# Patient Record
Sex: FEMALE | Race: AMERICAN INDIAN OR ALASKA NATIVE
[De-identification: names, ages, dates, MRNs, and addresses within clinical notes are randomized per-mention and may not be internally consistent; named-entity substitution may affect disease eponyms.]

---

## 2017-09-26 ENCOUNTER — HOSPITAL ENCOUNTER (EMERGENCY)
Dept: HOSPITAL 43 - DL.ED | Age: 67
Discharge: HOME | End: 2017-09-26
Payer: COMMERCIAL

## 2017-09-26 VITALS — SYSTOLIC BLOOD PRESSURE: 154 MMHG | DIASTOLIC BLOOD PRESSURE: 98 MMHG

## 2017-09-26 DIAGNOSIS — Z88.8: ICD-10-CM

## 2017-09-26 DIAGNOSIS — S39.011A: Primary | ICD-10-CM

## 2017-09-26 DIAGNOSIS — K21.9: ICD-10-CM

## 2017-09-26 DIAGNOSIS — Z79.82: ICD-10-CM
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
## 2017-09-26 DIAGNOSIS — Z91.018: ICD-10-CM

## 2017-09-26 DIAGNOSIS — E78.00: ICD-10-CM

## 2017-09-26 DIAGNOSIS — Z79.899: ICD-10-CM

## 2017-09-26 DIAGNOSIS — N18.9: ICD-10-CM

## 2017-09-26 DIAGNOSIS — E11.22: ICD-10-CM

## 2017-09-26 DIAGNOSIS — I12.9: ICD-10-CM

## 2017-09-26 DIAGNOSIS — Z91.030: ICD-10-CM

## 2017-09-26 LAB
CHLORIDE SERPL-SCNC: 98 MMOL/L (ref 101–111)
SODIUM SERPL-SCNC: 134 MMOL/L (ref 135–145)

## 2017-09-26 PROCEDURE — 36415 COLL VENOUS BLD VENIPUNCTURE: CPT

## 2017-09-26 PROCEDURE — 85025 COMPLETE CBC W/AUTO DIFF WBC: CPT

## 2017-09-26 PROCEDURE — 83690 ASSAY OF LIPASE: CPT

## 2017-09-26 PROCEDURE — 80053 COMPREHEN METABOLIC PANEL: CPT

## 2017-09-26 PROCEDURE — 80305 DRUG TEST PRSMV DIR OPT OBS: CPT

## 2017-09-26 PROCEDURE — 96372 THER/PROPH/DIAG INJ SC/IM: CPT

## 2017-09-26 PROCEDURE — 81001 URINALYSIS AUTO W/SCOPE: CPT

## 2017-09-26 PROCEDURE — 99284 EMERGENCY DEPT VISIT MOD MDM: CPT

## 2017-09-26 PROCEDURE — 82150 ASSAY OF AMYLASE: CPT

## 2017-09-26 NOTE — EDM.PDOC
ED HPI GENERAL MEDICAL PROBLEM





- General


Chief Complaint: Abdominal Pain


Stated Complaint: RT SIDED SEVERE PAINS, [id number]


Time Seen by Provider: 09/26/17 16:45


Source of Information: Reports: Patient


History Limitations: Reports: No Limitations





- History of Present Illness


INITIAL COMMENTS - FREE TEXT/NARRATIVE: 





This 66 yo female patient reports to the ED with right lower abdominal pain. 

The patient reports her pain started about 3 days ago and has been intermittent 

since that time. The patient reports increase pain when she walks. The patient 

does report that she was moving furniture on Saturday, just before the pain 

started. The patient reports she has been taking Tylenol for temporary symptom 

relief. The patient has not been seen by her primary care facility. 


Onset Date: 09/23/17


Duration: Day(s):, Intermittent


Location: Reports: Abdomen (right lower abdomen/right hip)


Quality: Reports: Ache, Sharp


Severity: Moderate


Improves with: Reports: Rest


Context: Reports: Activity (moving furniture)


Treatments PTA: Reports: Acetaminophen


  ** Right Lower Abdomen


Pain Score (Numeric/FACES): 8





- Related Data


 Allergies











Allergy/AdvReac Type Severity Reaction Status Date / Time


 


chocolate flavor Allergy  Hives Verified 09/26/17 16:07


 


diazepam [From Valium] Allergy  Confusion Verified 09/26/17 16:07


 


diphenhydramine Allergy  Confusion Verified 09/26/17 16:07





[From Benadryl]     


 


kiwi Allergy  Blisters Verified 09/26/17 16:07


 


lisinopril [From Prinivil] Allergy  Swollen Verified 09/26/17 16:07





   Tongue  


 


metformin [From Glucophage] Allergy  Cough Verified 09/26/17 16:07


 


pantoprazole Allergy  Other Verified 09/26/17 16:07


 


pineapple Allergy  Blisters Verified 09/26/17 16:07


 


BEE VENOM Allergy  Swelling Uncoded 09/26/17 16:07











Home Meds: 


 Home Meds





Acetaminophen [Tylenol Extra Strength] 1,000 mg PO Q6H PRN 09/23/16 [History]


Aspirin [Halfprin] 81 mg PO DAILY 09/23/16 [History]


Calcium Citrate/Vitamin D3 [Calcium Citrate + D] 1 tab PO BIDMEALS 09/23/16 [

History]


Cholecalciferol (Vitamin D3) [Vitamin D3] 1 cap PO DAILY 09/23/16 [History]


Famotidine 20 mg PO BID 09/23/16 [History]


Insulin Aspart [Novolog Flexpen] 10 units SQ TID 09/23/16 [History]


Insulin Detemir [Levemir Flextouch] 45 unit SQ BEDTIME 09/23/16 [History]


Isosorbide Mononitrate [Imdur] 30 mg PO DAILY 09/23/16 [History]


Loratadine [Claritin] 10 mg PO DAILY 09/23/16 [History]


Losartan [Cozaar] 100 mg PO DAILY 09/23/16 [History]


Multivitamin [Multivitamins] 1 cap PO .EVERYOTHERDAY 09/23/16 [History]


atorvaSTATin [Lipitor] 10 mg PO DAILY 09/23/16 [History]











Past Medical History


HEENT History: Reports: Cataract, Impaired Vision


Other HEENT History: wears glasses


Cardiovascular History: Reports: High Cholesterol, Hypertension


Respiratory History: Reports: None


Gastrointestinal History: Reports: GERD, Hiatal Hernia


Other Gastrointestinal History: SCHATZKI'S RING


Genitourinary History: Reports: Chronic Renal Insuffiency


Other Genitourinary History: RENAL STENT


OB/GYN History: Reports: Pregnancy


Musculoskeletal History: Reports: Other (See Below)


Other Musculoskeletal History: FRACTURED LEFT ANKLE X3


Neurological History: Reports: None


Psychiatric History: Reports: None


Endocrine/Metabolic History: Reports: Diabetes, Type II


Hematologic History: Reports: None


Immunologic History: Reports: None


Oncologic (Cancer) History: Reports: None


Dermatologic History: Reports: None





- Infectious Disease History


Infectious Disease History: Reports: Chicken Pox, Measles, Mumps





- Past Surgical History


Head Surgeries/Procedures: Reports: None


HEENT Surgical History: Reports: Cataract Surgery


Cardiovascular Surgical History: Reports: Coronary Artery Stent


GI Surgical History: Reports: EGD, Esophageal Dilatation


Musculoskeletal Surgical History: Reports: Arthroscopic Knee





Social & Family History





- Family History


Family Medical History: Noncontributory


Cardiac: Reports: High Cholesterol, Hypertension, MI


: Reports: Dialysis


Musculoskeletal: Reports: Arthritis


Endocrine/Metabolic: Reports: Diabetes, type II


Oncologic: Reports: Lung





- Tobacco Use


Smoking Status *Q: Never Smoker


Second Hand Smoke Exposure: No





- Caffeine Use


Caffeine Use: Reports: Coffee





- Recreational Drug Use


Recreational Drug Use: No





ED ROS GENERAL





- Review of Systems


Review Of Systems: ROS reveals no pertinent complaints other than HPI.





ED EXAM, GI/ABD





- Physical Exam


Exam: See Below


Exam Limited By: No Limitations


General Appearance: Alert, WD/WN, Mild Distress, Obese


Eyes: Bilateral: Normal Appearance, EOMI


Ears: Normal External Exam, Normal Canal, Hearing Grossly Normal, Normal TMs


Nose: Normal Inspection, Normal Mucosa, No Blood


Throat/Mouth: Normal Inspection, Normal Lips, Normal Teeth, Normal Gums, Normal 

Oropharynx, Normal Voice, No Airway Compromise


Head: Atraumatic, Normocephalic


Neck: Normal Inspection, Supple, Non-Tender, Full Range of Motion


Respiratory/Chest: No Respiratory Distress, Lungs Clear, Normal Breath Sounds, 

No Accessory Muscle Use, Chest Non-Tender


Cardiovascular: Normal Peripheral Pulses, Regular Rate, Rhythm, No Edema, No 

Gallop, No JVD, No Murmur, No Rub


GI/Abdominal Exam: Normal Bowel Sounds, Soft, Tender (right lower abdomen (very 

low into the right groin)), Other (no evidence of hernia)


 (Female) Exam: Deferred


Rectal (Female) Exam: Deferred


Back Exam: Normal Inspection, Full Range of Motion, NT


Extremities: Normal Inspection, Normal Range of Motion, Non-Tender, Normal 

Capillary Refill, No Pedal Edema


Neurological: Alert, Oriented, CN II-XII Intact, Normal Cognition, Normal Gait, 

Normal Reflexes, No Motor/Sensory Deficits


Psychiatric: Normal Affect, Normal Mood


Skin Exam: Warm, Dry, Intact, Normal Color, No Rash


Lymphatic: No Adenopathy





Course





- Vital Signs


Last Recorded V/S: 


 Last Vital Signs











Temp  36.2 C   09/26/17 16:22


 


Pulse  67   09/26/17 16:22


 


Resp  18   09/26/17 16:22


 


BP  154/98 H  09/26/17 16:22


 


Pulse Ox  100   09/26/17 16:22














- Orders/Labs/Meds


Orders: 


 Active Orders 24 hr











 Category Date Time Status


 


 UA W/MICROSCOPIC [URIN] Stat Lab  09/26/17 17:04 Received


 


 Orphenadrine [Norflex] Med  09/26/17 17:30 Ordered





 60 mg IM Q12H   








 Medication Orders





Orphenadrine Citrate (Norflex)  60 mg IM Q12H ELVER








Labs: 


 Laboratory Tests











  09/26/17 09/26/17 09/26/17 Range/Units





  16:39 16:39 16:39 


 


WBC   6.3   (5.0-10.0)  10^3/uL


 


RBC   4.26   (4.2-5.4)  10^6/uL


 


Hgb   12.2   (12.0-16.0)  g/dL


 


Hct   36.5 L   (37.0-47.0)  %


 


MCV   85.7   ()  fL


 


MCH   28.6   (27.0-34.0)  pg


 


MCHC   33.4   (33.0-35.0)  g/dL


 


Plt Count   203   (150-450)  10^3/uL


 


Neut % (Auto)   68.7   (42.2-75.2)  %


 


Lymph % (Auto)   20.4 L   (20.5-50.1)  %


 


Mono % (Auto)   6.9   (2-8)  %


 


Eos % (Auto)   3.4 H   (1.0-3.0)  %


 


Baso % (Auto)   0.6   (0.0-1.0)  %


 


Sodium    134 L  (135-145)  mmol/L


 


Potassium    3.9  (3.6-5.0)  mmol/L


 


Chloride    98 L  (101-111)  mmol/L


 


Carbon Dioxide    24.0  (21.0-31.0)  mmol/L


 


Anion Gap    15.9  


 


BUN    21 H  (7-18)  mg/dL


 


Creatinine    1.2  (0.6-1.3)  mg/dL


 


Est Cr Clr Drug Dosing    34.80  mL/min


 


Estimated GFR (MDRD)    45  


 


BUN/Creatinine Ratio    17.50  


 


Glucose    186 H  ()  mg/dL


 


Calcium    9.3  D  (8.4-10.2)  mg/dl


 


Total Bilirubin    0.7  (0.2-1.0)  mg/dL


 


AST    32  (10-42)  IU/L


 


ALT    31  (10-60)  IU/L


 


Alkaline Phosphatase    100  ()  IU/L


 


Total Protein    7.8  (6.7-8.2)  g/dl


 


Albumin    3.7  (3.2-5.5)  g/dl


 


Globulin    4.1  


 


Albumin/Globulin Ratio    0.90  


 


Amylase  29    ()  U/L


 


Lipase  27    (22-51)  U/L


 


Urine Opiates Screen     (NEGATIVE)  


 


Ur Oxycodone Screen     (NEGATIVE)  


 


Urine Methadone Screen     (NEGATIVE)  


 


Ur Barbiturates Screen     (NEGATIVE)  


 


U Tricyclic Antidepress     (NEGATIVE)  


 


Ur Phencyclidine Scrn     (NEGATIVE)  


 


Ur Amphetamine Screen     (NEGATIVE)  


 


U Methamphetamines Scrn     (NEGATIVE)  


 


Urine MDMA Screen     (NEGATIVE)  


 


U Benzodiazepines Scrn     (NEGATIVE)  


 


Urine Cocaine Screen     (NEGATIVE)  


 


U Marijuana (THC) Screen     (NEGATIVE)  














  09/26/17 Range/Units





  17:04 


 


WBC   (5.0-10.0)  10^3/uL


 


RBC   (4.2-5.4)  10^6/uL


 


Hgb   (12.0-16.0)  g/dL


 


Hct   (37.0-47.0)  %


 


MCV   ()  fL


 


MCH   (27.0-34.0)  pg


 


MCHC   (33.0-35.0)  g/dL


 


Plt Count   (150-450)  10^3/uL


 


Neut % (Auto)   (42.2-75.2)  %


 


Lymph % (Auto)   (20.5-50.1)  %


 


Mono % (Auto)   (2-8)  %


 


Eos % (Auto)   (1.0-3.0)  %


 


Baso % (Auto)   (0.0-1.0)  %


 


Sodium   (135-145)  mmol/L


 


Potassium   (3.6-5.0)  mmol/L


 


Chloride   (101-111)  mmol/L


 


Carbon Dioxide   (21.0-31.0)  mmol/L


 


Anion Gap   


 


BUN   (7-18)  mg/dL


 


Creatinine   (0.6-1.3)  mg/dL


 


Est Cr Clr Drug Dosing   mL/min


 


Estimated GFR (MDRD)   


 


BUN/Creatinine Ratio   


 


Glucose   ()  mg/dL


 


Calcium   (8.4-10.2)  mg/dl


 


Total Bilirubin   (0.2-1.0)  mg/dL


 


AST   (10-42)  IU/L


 


ALT   (10-60)  IU/L


 


Alkaline Phosphatase   ()  IU/L


 


Total Protein   (6.7-8.2)  g/dl


 


Albumin   (3.2-5.5)  g/dl


 


Globulin   


 


Albumin/Globulin Ratio   


 


Amylase   ()  U/L


 


Lipase   (22-51)  U/L


 


Urine Opiates Screen  Negative  (NEGATIVE)  


 


Ur Oxycodone Screen  Negative  (NEGATIVE)  


 


Urine Methadone Screen  Negative  (NEGATIVE)  


 


Ur Barbiturates Screen  Negative  (NEGATIVE)  


 


U Tricyclic Antidepress  Negative  (NEGATIVE)  


 


Ur Phencyclidine Scrn  Negative  (NEGATIVE)  


 


Ur Amphetamine Screen  Negative  (NEGATIVE)  


 


U Methamphetamines Scrn  Negative  (NEGATIVE)  


 


Urine MDMA Screen  Negative  (NEGATIVE)  


 


U Benzodiazepines Scrn  Negative  (NEGATIVE)  


 


Urine Cocaine Screen  Negative  (NEGATIVE)  


 


U Marijuana (THC) Screen  Negative  (NEGATIVE)  











Meds: 


Medications











Generic Name Dose Route Start Last Admin





  Trade Name Freq  PRN Reason Stop Dose Admin


 


Orphenadrine Citrate  60 mg  09/26/17 17:30  





  Norflex  IM   





  Q12H ELVER   














Discontinued Medications














Generic Name Dose Route Start Last Admin





  Trade Name Serena  PRN Reason Stop Dose Admin


 


Ketorolac Tromethamine  30 mg  09/26/17 17:26  





  Toradol  IM  09/26/17 17:27  





  ONETIME ONE   














Departure





- Departure


Time of Disposition: 17:29


Disposition: Home, Self-Care 01


Condition: Fair


Clinical Impression: 


Abdominal wall strain


Qualifiers:


 Encounter type: initial encounter Qualified Code(s): S39.011A - Strain of 

muscle, fascia and tendon of abdomen, initial encounter








- Discharge Information


Instructions:  Muscle Strain, Easy-to-Read


Forms:  ED Department Discharge


Care Plan Goals: 


The patient was advised of the examination and lab results during the visit. 

The patient was given an injection of Toradol (60 mg) and Norflex (60 mg) while 

in the ED. The patient was discharged with a script for Toradol (10 mg) #20 to 

take 1 by mouth every 6 hours and Flexeril (10 mg) #10 to take 1 by mouth at 

bedtime as needed. The patient may continue to take Tylenol as directed for 

temporary symptom relief. If the patient has any additional symptoms or further 

concerns, the patient should either follow-up with her primary care facility or 

return to the emergency department. 





- My Orders


Last 24 Hours: 


My Active Orders





09/26/17 17:04


UA W/MICROSCOPIC [URIN] Stat 





09/26/17 17:30


Orphenadrine [Norflex]   60 mg IM Q12H 














- Assessment/Plan


Last 24 Hours: 


My Active Orders





09/26/17 17:04


UA W/MICROSCOPIC [URIN] Stat 





09/26/17 17:30


Orphenadrine [Norflex]   60 mg IM Q12H

## 2019-06-19 ENCOUNTER — HOSPITAL ENCOUNTER (EMERGENCY)
Dept: HOSPITAL 43 - DL.ED | Age: 69
Discharge: NURSING, REHAB OR LONG-TERM CARE | End: 2019-06-19
Payer: COMMERCIAL

## 2019-06-19 VITALS — DIASTOLIC BLOOD PRESSURE: 71 MMHG | SYSTOLIC BLOOD PRESSURE: 109 MMHG

## 2019-06-19 DIAGNOSIS — I12.9: ICD-10-CM

## 2019-06-19 DIAGNOSIS — I21.4: Primary | ICD-10-CM

## 2019-06-19 DIAGNOSIS — Z88.5: ICD-10-CM

## 2019-06-19 DIAGNOSIS — Z88.8: ICD-10-CM

## 2019-06-19 DIAGNOSIS — Z79.82: ICD-10-CM

## 2019-06-19 DIAGNOSIS — N18.9: ICD-10-CM

## 2019-06-19 DIAGNOSIS — K21.9: ICD-10-CM

## 2019-06-19 DIAGNOSIS — E78.00: ICD-10-CM

## 2019-06-19 DIAGNOSIS — E11.22: ICD-10-CM

## 2019-06-19 DIAGNOSIS — Z79.899: ICD-10-CM

## 2019-06-19 DIAGNOSIS — Z91.030: ICD-10-CM

## 2019-06-19 DIAGNOSIS — Z79.4: ICD-10-CM

## 2019-06-19 LAB
ANION GAP SERPL CALC-SCNC: 13.9 MMOL/L
CHLORIDE SERPL-SCNC: 103 MMOL/L (ref 101–111)
SODIUM SERPL-SCNC: 135 MMOL/L (ref 135–145)

## 2019-06-19 RX ADMIN — NITROGLYCERIN ONE MG: 0.4 TABLET SUBLINGUAL at 02:28

## 2019-06-19 RX ADMIN — NITROGLYCERIN ONE MG: 0.4 TABLET SUBLINGUAL at 01:40

## 2019-06-19 RX ADMIN — NITROGLYCERIN ONE MG: 0.4 TABLET SUBLINGUAL at 01:37

## 2019-06-19 NOTE — EDM.PDOC
ED HPI GENERAL MEDICAL PROBLEM





- General


Chief Complaint: Chest Pain


Stated Complaint: CHEST PAIN AND THROWING UP [id number]


Time Seen by Provider: 19 00:25


Source of Information: Reports: Patient, Family


History Limitations: Reports: No Limitations





- History of Present Illness


INITIAL COMMENTS - FREE TEXT/NARRATIVE: 





ED with c/o eating lettuce sald at 2130, 2200 started vomiting x 3 and 5 loose 

diarrhea stools  left sided chest pain sharp jabbing type started after and 

burning in throat. No fever, No sweating, No blood in emesis or stool. No SOB. 

Hx 3 cardiac stents and 1 renal stent.


  ** Left Anterior Chest


Pain Score (Numeric/FACES): 5





- Related Data


 Allergies











Allergy/AdvReac Type Severity Reaction Status Date / Time


 


chocolate flavor Allergy  Hives Verified 19 00:33


 


diazepam [From Valium] Allergy  Confusion Verified 19 00:33


 


diphenhydramine Allergy  Confusion Verified 19 00:33





[From Benadryl]     


 


kiwi Allergy  Blisters Verified 19 00:33


 


lisinopril [From Prinivil] Allergy  Swollen Verified 19 00:33





   Tongue  


 


metformin [From Glucophage] Allergy  Cough Verified 19 00:33


 


pantoprazole Allergy  Other Verified 19 00:33


 


pineapple Allergy  Blisters Verified 19 00:33


 


BEE VENOM Allergy  Swelling Uncoded 19 00:33











Home Meds: 


 Home Meds





Acetaminophen [Tylenol Extra Strength] 1,000 mg PO Q6H PRN 16 [History]


Aspirin [Halfprin] 81 mg PO DAILY 16 [History]


Calcium Citrate/Vitamin D3 [Calcium Citrate + D] 1 tab PO BIDMEALS 16 [

History]


Cholecalciferol (Vitamin D3) [Vitamin D3] 1 cap PO DAILY 16 [History]


Famotidine 20 mg PO BID 16 [History]


Insulin Aspart [Novolog Flexpen] 10 units SQ TID 16 [History]


Insulin Detemir [Levemir Flextouch] 45 unit SQ BEDTIME 16 [History]


Isosorbide Mononitrate [Imdur] 30 mg PO DAILY 16 [History]


Loratadine [Claritin] 10 mg PO DAILY 16 [History]


Losartan [Cozaar] 100 mg PO DAILY 16 [History]


Multivitamin [Multivitamins] 1 cap PO DAILY 16 [History]


atorvaSTATin [Lipitor] 10 mg PO DAILY 16 [History]


Clopidogrel [Plavix] 1 tab PO DAILY 10/09/17 [History]


Ibuprofen [Advil] 800 mg PO TID 10/09/17 [History]











Past Medical History


HEENT History: Reports: Cataract, Impaired Vision


Other HEENT History: wears glasses


Cardiovascular History: Reports: High Cholesterol, Hypertension


Respiratory History: Reports: None


Gastrointestinal History: Reports: GERD, Hiatal Hernia


Other Gastrointestinal History: SCHATZKI'S RING


Genitourinary History: Reports: Chronic Renal Insuffiency


Other Genitourinary History: RENAL STENT


OB/GYN History: Reports: Pregnancy


Musculoskeletal History: Reports: Other (See Below)


Other Musculoskeletal History: FRACTURED LEFT ANKLE X3


Neurological History: Reports: None


Psychiatric History: Reports: None


Endocrine/Metabolic History: Reports: Diabetes, Type II


Hematologic History: Reports: None


Immunologic History: Reports: None


Oncologic (Cancer) History: Reports: None


Dermatologic History: Reports: None





- Infectious Disease History


Infectious Disease History: Reports: Chicken Pox, Measles, Mumps





- Past Surgical History


Head Surgeries/Procedures: Reports: None


HEENT Surgical History: Reports: Cataract Surgery


Cardiovascular Surgical History: Reports: Coronary Artery Stent


Other Cardiovascular Surgeries/Procedures: ELECTROCARDIOGRAM


Respiratory Surgical History: Reports: None


GI Surgical History: Reports: EGD, Esophageal Dilatation


Neurological Surgical History: Reports: None


Musculoskeletal Surgical History: Reports: Arthroscopic Knee


Oncologic Surgical History: Reports: None


Dermatological Surgical History: Reports: None





Social & Family History





- Family History


Family Medical History: Noncontributory


Cardiac: Reports: High Cholesterol, Hypertension, MI


: Reports: Dialysis


Musculoskeletal: Reports: Arthritis


Endocrine/Metabolic: Reports: Diabetes, type II


Oncologic: Reports: Lung





- Tobacco Use


Smoking Status *Q: Never Smoker


Second Hand Smoke Exposure: No





- Caffeine Use


Caffeine Use: Reports: Coffee





- Recreational Drug Use


Recreational Drug Use: No





ED ROS GENERAL





- Review of Systems


Review Of Systems: ROS reveals no pertinent complaints other than HPI.





ED EXAM, GENERAL





- Physical Exam


Exam: See Below


Exam Limited By: No Limitations


General Appearance: Alert, Mild Distress


Eye Exam: Bilateral Eye: EOMI


Ears: Normal External Exam


Nose: Normal Inspection


Throat/Mouth: Normal Inspection, Normal Lips, Normal Voice, No Airway Compromise


Head: Atraumatic, Normocephalic


Neck: Normal Inspection, Full Range of Motion.  No: Lymphadenopathy (L), 

Lymphadenopathy (R)


Respiratory/Chest: No Respiratory Distress, Lungs Clear, Normal Breath Sounds, 

Chest Non-Tender.  No: Respiratory Distress, Crackles, Rales, Rhonchi, Wheezing


Cardiovascular: Normal Peripheral Pulses, Regular Rate, Rhythm, No Edema


GI/Abdominal: Normal Bowel Sounds, Soft.  No: Tender


Back Exam: Normal Inspection


Extremities: Normal Inspection


Neurological: Alert, Oriented, Normal Cognition, Normal Gait


Psychiatric: Flat Affect


Skin Exam: Warm, Dry, Intact, Normal Color





Course





- Vital Signs


Last Recorded V/S: 


 Last Vital Signs











Temp  96.4 F   19 00:14


 


Pulse  88   19 00:14


 


Resp  14   19 00:14


 


BP  109/71   19 02:28


 


Pulse Ox  100   19 00:14














- Orders/Labs/Meds


Orders: 


 Active Orders 24 hr











 Category Date Time Status


 


 EKG 12 Lead [EKG Documentation Completion] [RC] STAT Care  19 00:19 

Active


 


 EKG 12 Lead [EKG Documentation Completion] [RC] STAT Care  19 01:48 

Active











Labs: 


 Laboratory Tests











  19 Range/Units





  00:18 00:25 00:25 


 


WBC   8.7   (5.0-10.0)  10^3/uL


 


RBC   5.13   (4.2-5.4)  10^6/uL


 


Hgb   15.0  D   (12.0-16.0)  g/dL


 


Hct   43.5   (37.0-47.0)  %


 


MCV   84.8   ()  fL


 


MCH   29.2   (27.0-34.0)  pg


 


MCHC   34.5   (33.0-35.0)  g/dL


 


Plt Count   218   (150-450)  10^3/uL


 


Neut % (Auto)   71.0   (42.2-75.2)  %


 


Lymph % (Auto)   19.9 L   (20.5-50.1)  %


 


Mono % (Auto)   5.7   (2-8)  %


 


Eos % (Auto)   3.1 H   (1.0-3.0)  %


 


Baso % (Auto)   0.3   (0.0-1.0)  %


 


PT     (9.0-12.0)  SEC


 


INR     (0.9-1.2)  


 


APTT     (22.0-34.0)  SEC


 


Sodium    135  (135-145)  mmol/L


 


Potassium    3.9  (3.6-5.0)  mmol/L


 


Chloride    103  (101-111)  mmol/L


 


Carbon Dioxide    22.0  (21.0-31.0)  mmol/L


 


Anion Gap    13.9  


 


BUN    22 H  (7-18)  mg/dL


 


Creatinine    1.3  (0.6-1.3)  mg/dL


 


Est Cr Clr Drug Dosing    31.69  mL/min


 


Estimated GFR (MDRD)    41  


 


BUN/Creatinine Ratio    16.92  


 


Glucose    181 H  ()  mg/dL


 


POC Glucose  177 H    ()  mg/dl


 


Calcium    9.6  (8.4-10.2)  mg/dl


 


Total Bilirubin    0.8  (0.2-1.0)  mg/dL


 


AST    72 H  (10-42)  IU/L


 


ALT    78 H  (10-60)  IU/L


 


Alkaline Phosphatase    155 H  ()  IU/L


 


Troponin I    0.37 H*  (0.00-0.02)  ng/ml


 


B-Natriuretic Peptide    65  (0-100)  pg/ml


 


Total Protein    8.4 H  (6.7-8.2)  g/dl


 


Albumin    4.2  (3.2-5.5)  g/dl


 


Globulin    4.2  


 


Albumin/Globulin Ratio    1.00  














  19 Range/Units





  00:25 


 


WBC   (5.0-10.0)  10^3/uL


 


RBC   (4.2-5.4)  10^6/uL


 


Hgb   (12.0-16.0)  g/dL


 


Hct   (37.0-47.0)  %


 


MCV   ()  fL


 


MCH   (27.0-34.0)  pg


 


MCHC   (33.0-35.0)  g/dL


 


Plt Count   (150-450)  10^3/uL


 


Neut % (Auto)   (42.2-75.2)  %


 


Lymph % (Auto)   (20.5-50.1)  %


 


Mono % (Auto)   (2-8)  %


 


Eos % (Auto)   (1.0-3.0)  %


 


Baso % (Auto)   (0.0-1.0)  %


 


PT  9.1  (9.0-12.0)  SEC


 


INR  0.9  (0.9-1.2)  


 


APTT  24.8  (22.0-34.0)  SEC


 


Sodium   (135-145)  mmol/L


 


Potassium   (3.6-5.0)  mmol/L


 


Chloride   (101-111)  mmol/L


 


Carbon Dioxide   (21.0-31.0)  mmol/L


 


Anion Gap   


 


BUN   (7-18)  mg/dL


 


Creatinine   (0.6-1.3)  mg/dL


 


Est Cr Clr Drug Dosing   mL/min


 


Estimated GFR (MDRD)   


 


BUN/Creatinine Ratio   


 


Glucose   ()  mg/dL


 


POC Glucose   ()  mg/dl


 


Calcium   (8.4-10.2)  mg/dl


 


Total Bilirubin   (0.2-1.0)  mg/dL


 


AST   (10-42)  IU/L


 


ALT   (10-60)  IU/L


 


Alkaline Phosphatase   ()  IU/L


 


Troponin I   (0.00-0.02)  ng/ml


 


B-Natriuretic Peptide   (0-100)  pg/ml


 


Total Protein   (6.7-8.2)  g/dl


 


Albumin   (3.2-5.5)  g/dl


 


Globulin   


 


Albumin/Globulin Ratio   











Meds: 


Medications














Discontinued Medications














Generic Name Dose Route Start Last Admin





  Trade Name Freq  PRN Reason Stop Dose Admin


 


Aspirin  324 mg  19 01:20  19 01:31





  Aspirin  PO  19 01:21  324 mg





  ONETIME ONE   Administration





     





     





     





     


 


Clopidogrel Bisulfate  600 mg  19 02:21  19 02:25





  Plavix  PO  19 02:22  600 mg





  ONETIME ONE   Administration





     





     





     





     


 


Heparin Sodium (Porcine)  4,000 units  19 02:00  19 02:17





  Heparin Sodium  IVPUSH  19 02:01  4,000 units





  .BOLUS ONE   Administration





     





     





     





     


 


Sodium Chloride  1,000 mls @ 50 mls/hr  19 01:45  19 02:34





  Normal Saline  IV   450 mls/hr





  ASDIRECTED ELVER   Infusion





     





     





     





     


 


Heparin Sodium/Sodium Chloride  25,000 units in 500 mls @ 17.724 mls/hr   02:00  19 02:21





  Heparin 25,000 Units In 1/2 Ns 500 Ml  IV   12 units/kg/hr





  TITRATE ELVER   17.724 mls/hr





     Administration





     





  Protocol   





  12 UNITS/KG/HR   


 


Nitroglycerin  0.4 mg  19 01:21  19 02:28





  Nitrostat  SL  19 01:22  0.4 mg





  ONETIME ONE   Administration





     





     





     





     


 


Ondansetron HCl  4 mg  19 01:26  19 01:36





  Zofran  IV  19 01:27  4 mg





  ONETIME ONE   Administration





     





     





     





     














- Radiology Interpretation


Free Text/Narrative:: 





[facility] - [facility]


Final Radiology Report  Call: [phone]


assistance Online chat: [email or web address]


Name: KORTNEY VINSON Age: 68Years F Date: 2019


MRN: [id number] SSN: -- : 1950


Study: XR CHEST 1 VIEW FRONTAL Requesting Physician: MOHINDER MEHTA


Accession: [id number] Images: 1


Addl Studies:


Provided Clinical History:


Contrast: Contrast Medium:


Contrast Amount: Contrast Method:


CONFIDENTIALITY STATEMENT


This report is intended only for use by the referring physician, and only in 

accordance with law. If you received this in error, call [phone].


Page 1 of 1


EXAM:


XR Chest, 1 View


EXAM DATE/TIME:


2019 12:44 AM


CLINICAL HISTORY:


68 years old, female; Chest pain; Type not specified


TECHNIQUE:


Imaging protocol: XR of the chest, 1 view.


COMPARISON:


CR Chest 2V 2017 3:56 PM


FINDINGS:


Lungs: Clear lungs.


Pleural space: No pneumothorax. No sizable pleural effusion.


Heart/Mediastinum: No cardiomegaly.


Bones/joints: Unremarkable.


IMPRESSION:


Clear lungs.


Thank you for allowing us to participate in the care of your patient.


Dictated and Authenticated by: Reji Draper MD


2019 1:46 AM Central Time ([place] & [place])





- Re-Assessments/Exams


Free Text/Narrative Re-Assessment/Exam: 





19 05:25


TC Consult Dr Siddiqi and Dr Doe. Elevation V6 partial V5 without reciprocal 

changes. Accept in Tx Tx via LRAS. Stable condition. Nitro x 3 total for relief 

of intermittent chest pain and burning in throat. No pain at time of transfer. 





Departure





- Departure


Time of Disposition: 03:05


Disposition: DC/[facility]fer to Acute Hospital [facility]


Reason for Transfer *Q: Other


Condition: Undetermined


Clinical Impression: 


 NSTEMI (non-ST elevated myocardial infarction)





Referrals: 


PCP,Unobtain [Primary Care Provider] - 


Forms:  ED Department Discharge





- My Orders


Last 24 Hours: 


My Active Orders





19 00:19


EKG 12 Lead [EKG Documentation Completion] [RC] STAT 





19 01:48


EKG 12 Lead [EKG Documentation Completion] [RC] STAT 














- Assessment/Plan


Last 24 Hours: 


My Active Orders





19 00:19


EKG 12 Lead [EKG Documentation Completion] [RC] STAT 





19 01:48


EKG 12 Lead [EKG Documentation Completion] [RC] STAT

## 2019-10-05 ENCOUNTER — HOSPITAL ENCOUNTER (EMERGENCY)
Dept: HOSPITAL 43 - DL.ED | Age: 69
Discharge: HOME | End: 2019-10-05
Payer: COMMERCIAL

## 2019-10-05 VITALS — SYSTOLIC BLOOD PRESSURE: 151 MMHG | HEART RATE: 70 BPM | DIASTOLIC BLOOD PRESSURE: 65 MMHG

## 2019-10-05 DIAGNOSIS — E11.22: ICD-10-CM

## 2019-10-05 DIAGNOSIS — Z79.4: ICD-10-CM

## 2019-10-05 DIAGNOSIS — Z79.82: ICD-10-CM

## 2019-10-05 DIAGNOSIS — R42: ICD-10-CM

## 2019-10-05 DIAGNOSIS — N18.9: ICD-10-CM

## 2019-10-05 DIAGNOSIS — R07.89: Primary | ICD-10-CM

## 2019-10-05 DIAGNOSIS — Z91.018: ICD-10-CM

## 2019-10-05 DIAGNOSIS — E78.00: ICD-10-CM

## 2019-10-05 DIAGNOSIS — Z79.899: ICD-10-CM

## 2019-10-05 DIAGNOSIS — I25.10: ICD-10-CM

## 2019-10-05 DIAGNOSIS — I25.2: ICD-10-CM

## 2019-10-05 DIAGNOSIS — K21.9: ICD-10-CM

## 2019-10-05 DIAGNOSIS — Z88.8: ICD-10-CM

## 2019-10-05 DIAGNOSIS — I13.10: ICD-10-CM

## 2019-10-05 LAB
ANION GAP SERPL CALC-SCNC: 14.2 MMOL/L
CHLORIDE SERPL-SCNC: 99 MMOL/L (ref 101–111)
SODIUM SERPL-SCNC: 134 MMOL/L (ref 135–145)

## 2019-10-05 PROCEDURE — 99285 EMERGENCY DEPT VISIT HI MDM: CPT

## 2019-10-05 PROCEDURE — 93005 ELECTROCARDIOGRAM TRACING: CPT

## 2019-10-05 PROCEDURE — 80053 COMPREHEN METABOLIC PANEL: CPT

## 2019-10-05 PROCEDURE — 83880 ASSAY OF NATRIURETIC PEPTIDE: CPT

## 2019-10-05 PROCEDURE — 83690 ASSAY OF LIPASE: CPT

## 2019-10-05 PROCEDURE — 85025 COMPLETE CBC W/AUTO DIFF WBC: CPT

## 2019-10-05 PROCEDURE — 84484 ASSAY OF TROPONIN QUANT: CPT

## 2019-10-05 PROCEDURE — 71045 X-RAY EXAM CHEST 1 VIEW: CPT

## 2019-10-05 PROCEDURE — 36415 COLL VENOUS BLD VENIPUNCTURE: CPT

## 2019-10-05 NOTE — EDM.PDOC
Scribed by Анна Ndiaye 10/05/19 2817 for Tanner Jose MD





ED HPI GENERAL MEDICAL PROBLEM





- General


Chief Complaint: Chest Pain


Stated Complaint: CHEST PAINS


Time Seen by Provider: 10/05/19 14:20


Source of Information: Reports: Patient, RN, RN Notes Reviewed


History Limitations: Reports: No Limitations





- History of Present Illness


INITIAL COMMENTS - FREE TEXT/NARRATIVE: 


Patient presents to ER via POV with chest pain that started worsening last 

night. She had triple by pass in . Since her bypass she reports frequent/

recurrent episodes of similar pain and goes across her chest. Pt states she 

told her "heart doctor" about the pains and was told it was nothing to worry 

about. She was slightly lightheaded this morning as well. Currently the pt 

states she is pain free. She took 1 baby ASA prior to arrival. She does not 

have nitro at home. She states it feels like tightness and burning. Denies cough

, fever, chills, wheezing, orthopnea, edema, or nausea. Pt states she just got 

custody of her 4yr old granddaughter and is home alone with the child so she 

wanted to make sure that her heart was ok today. Pt's medical record shows that 

her Troponin has been steady at 0.04 since her CABG in 2019.





Onset Date: 10/04/19


Duration: Chronic, Recurring


Location: Reports: Chest


Quality: Reports: Burning, Pressure


Severity: Severe


Improves with: Reports: None


Worsens with: Reports: None


Associated Symptoms: Reports: No Other Symptoms


  ** Chest


Pain Score (Numeric/FACES): 6





- Related Data


 Allergies











Allergy/AdvReac Type Severity Reaction Status Date / Time


 


chocolate flavor Allergy  Hives Verified 10/05/19 14:32


 


diazepam [From Valium] Allergy  Confusion Verified 10/05/19 14:32


 


diphenhydramine Allergy  Confusion Verified 10/05/19 14:32





[From Benadryl]     


 


kiwi Allergy  Blisters Verified 10/05/19 14:32


 


lisinopril [From Prinivil] Allergy  Swollen Verified 10/05/19 14:32





   Tongue  


 


metformin [From Glucophage] Allergy  Cough Verified 10/05/19 14:32


 


pantoprazole Allergy  Other Verified 10/05/19 14:32


 


pineapple Allergy  Blisters Verified 10/05/19 14:32


 


BEE VENOM Allergy  Swelling Uncoded 10/05/19 14:32











Home Meds: 


 Home Meds





Acetaminophen [Tylenol Extra Strength] 1,000 mg PO Q6H PRN 16 [History]


Aspirin [Halfprin] 81 mg PO DAILY 16 [History]


Calcium Citrate/Vitamin D3 [Calcium Citrate + D] 1 tab PO DAILY 16 [

History]


Cholecalciferol (Vitamin D3) [Vitamin D3] 1,000 units PO DAILY 16 [History

]


Insulin Aspart [Novolog Flexpen] 14 units SQ ASDIRECTED 16 [History]


Insulin Detemir [Levemir Flextouch] 45 unit SQ BEDTIME 16 [History]


Loratadine [Claritin] 10 mg PO DAILY 16 [History]


Multivitamin [Multivitamins] 1 cap PO DAILY 16 [History]


atorvaSTATin [Lipitor] 40 mg PO BEDTIME 16 [History]


Docusate Sodium 200 mg PO BID PRN 19 [History]


Ferrous Sulfate 325 mg PO BID 19 [History]


Metoprolol Tartrate 12.5 mg PO BID 19 [History]


Omeprazole 20 mg PO DAILY 19 [History]


Saxagliptin HCl [Onglyza] 50 mg PO DAILY 19 [History]











Past Medical History


HEENT History: Reports: Cataract, Impaired Vision


Other HEENT History: wears glasses


Cardiovascular History: Reports: CAD, High Cholesterol, Hypertension, MI


Respiratory History: Reports: None


Gastrointestinal History: Reports: GERD, Hiatal Hernia


Other Gastrointestinal History: SCHATZKI'S RING


Genitourinary History: Reports: Chronic Renal Insuffiency


Other Genitourinary History: RENAL STENT


OB/GYN History: Reports: Pregnancy


Musculoskeletal History: Reports: Other (See Below)


Other Musculoskeletal History: FRACTURED LEFT ANKLE X3


Neurological History: Reports: None


Psychiatric History: Reports: None


Endocrine/Metabolic History: Reports: Diabetes, Type II


Hematologic History: Reports: None


Immunologic History: Reports: None


Oncologic (Cancer) History: Reports: None


Dermatologic History: Reports: None





- Infectious Disease History


Infectious Disease History: Reports: Chicken Pox, Measles, Mumps





- Past Surgical History


Head Surgeries/Procedures: Reports: None


HEENT Surgical History: Reports: Cataract Surgery


Cardiovascular Surgical History: Reports: Coronary Artery Bypass, Coronary 

Artery Stent


Other Cardiovascular Surgeries/Procedures: ELECTROCARDIOGRAM


Respiratory Surgical History: Reports: None


GI Surgical History: Reports: EGD, Esophageal Dilatation


Neurological Surgical History: Reports: None


Musculoskeletal Surgical History: Reports: Arthroscopic Knee


Oncologic Surgical History: Reports: None


Dermatological Surgical History: Reports: None





Social & Family History





- Family History


Family Medical History: Noncontributory


Cardiac: Reports: High Cholesterol, Hypertension, MI


: Reports: Dialysis


Musculoskeletal: Reports: Arthritis


Endocrine/Metabolic: Reports: Diabetes, type II


Oncologic: Reports: Lung





- Caffeine Use


Caffeine Use: Reports: Coffee





- Living Situation & Occupation


Living situation: Reports: with Family


Occupation: Retired





ED ROS GENERAL





- Review of Systems


Review Of Systems: ROS reveals no pertinent complaints other than HPI.





ED EXAM, GENERAL





- Physical Exam


Exam: See Below


Exam Limited By: No Limitations


General Appearance: Alert, WD/WN, No Apparent Distress, Anxious


Eye Exam: Bilateral Eye: Normal Inspection


Nose: Normal Inspection


Throat/Mouth: Normal Inspection, Normal Lips, Normal Voice, No Airway Compromise


Head: Atraumatic, Normocephalic


Neck: Normal Inspection, Supple, Non-Tender, Full Range of Motion


Respiratory/Chest: No Respiratory Distress, Lungs Clear, Normal Breath Sounds, 

No Accessory Muscle Use, Chest Non-Tender


Cardiovascular: Regular Rate, Rhythm, No Edema, No JVD, No Murmur


GI/Abdominal: Normal Bowel Sounds, Soft, Non-Tender, No Organomegaly, No 

Distention, No Abnormal Bruit, No Mass


Back Exam: Normal Inspection


Extremities: Normal Inspection, Normal Range of Motion, Non-Tender, Normal 

Capillary Refill, No Pedal Edema


Neurological: Alert, Oriented, CN II-XII Intact, Normal Cognition, Normal Gait, 

No Motor/Sensory Deficits


Psychiatric: Anxious


Skin Exam: Warm, Dry, Intact, Normal Color, No Rash





EKG INTERPRETATION


EKG Date: 10/05/19


Time: 14:16


Rhythm: Other (sinus rhythm)


Rate (Beats/Min): 71


Axis: Normal


P-Wave: Present


QRS: Other (inferior Q waves)


ST-T: Elevated (nonspecific T wave abnormalities in lateral leads)


QT: Normal


Comparison: No Change





Course





- Vital Signs


Last Recorded V/S: 


 Last Vital Signs











Temp  97.2 F   10/05/19 14:18


 


Pulse  70   10/05/19 14:18


 


Resp  12   10/05/19 14:18


 


BP  151/65 H  10/05/19 14:18


 


Pulse Ox  100   10/05/19 14:18








 





Orthostatic Blood Pressure [     146/82


Standing]                        


Orthostatic Blood Pressure [     154/78


Sitting]                         


Orthostatic Blood Pressure [     140/68


Supine]                          





Not orthostatic.








- Orders/Labs/Meds


Orders: 


 Active Orders 24 hr











 Category Date Time Status


 


 EKG 12 Lead [EKG Documentation Completion] [RC] STAT Care  10/05/19 14:20 

Active


 


 Orthostatic Vital Signs [RC] ASDIRECTED Care  10/05/19 15:25 Active


 


 Peripheral IV Care [RC] .AS DIRECTED Care  10/05/19 14:20 Active


 


 Chest 1V Frontal [CR] Stat Exams  10/05/19 14:20 Taken


 


 Sodium Chloride 0.9% [Saline Flush] Med  10/05/19 14:20 Active





 10 ml FLUSH ASDIRECTED PRN   


 


 Peripheral IV Insertion Adult [OM.PC] Stat Oth  10/05/19 14:20 Ordered








 Medication Orders





Sodium Chloride (Saline Flush)  10 ml FLUSH ASDIRECTED PRN


   PRN Reason: Keep Vein Open


   Last Admin: 10/05/19 14:28  Dose: 10 ml








Labs: 


 Laboratory Tests











  10/05/19 10/05/19 Range/Units





  14:34 14:34 


 


WBC  5.8   (5.0-10.0)  10^3/uL


 


RBC  4.46   (4.2-5.4)  10^6/uL


 


Hgb  12.5   (12.0-16.0)  g/dL


 


Hct  37.7   (37.0-47.0)  %


 


MCV  84.5   ()  fL


 


MCH  28.0   (27.0-34.0)  pg


 


MCHC  33.2   (33.0-35.0)  g/dL


 


Plt Count  259   (150-450)  10^3/uL


 


Neut % (Auto)  76.6 H   (42.2-75.2)  %


 


Lymph % (Auto)  16.1 L   (20.5-50.1)  %


 


Mono % (Auto)  4.2   (2-8)  %


 


Eos % (Auto)  2.8   (1.0-3.0)  %


 


Baso % (Auto)  0.3   (0.0-1.0)  %


 


Sodium   134 L  (135-145)  mmol/L


 


Potassium   4.2  (3.6-5.0)  mmol/L


 


Chloride   99 L  (101-111)  mmol/L


 


Carbon Dioxide   25.0  (21.0-31.0)  mmol/L


 


Anion Gap   14.2  


 


BUN   17  (7-18)  mg/dL


 


Creatinine   1.1  (0.6-1.3)  mg/dL


 


Est Cr Clr Drug Dosing   36.94  mL/min


 


Estimated GFR (MDRD)   49  


 


BUN/Creatinine Ratio   15.45  


 


Glucose   241 H  ()  mg/dL


 


Calcium   9.1  (8.4-10.2)  mg/dl


 


Total Bilirubin   0.8  (0.2-1.0)  mg/dL


 


AST   28  (10-42)  IU/L


 


ALT   19  (10-60)  IU/L


 


Alkaline Phosphatase   109  ()  IU/L


 


Troponin I   0.04 H*  (0.00-0.02)  ng/ml


 


B-Natriuretic Peptide   82  (0-100)  pg/ml


 


Total Protein   7.5  (6.7-8.2)  g/dl


 


Albumin   3.8  (3.2-5.5)  g/dl


 


Globulin   3.7  


 


Albumin/Globulin Ratio   1.03  


 


Lipase   39  (22-51)  U/L











Meds: 


Medications











Generic Name Dose Route Start Last Admin





  Trade Name Freq  PRN Reason Stop Dose Admin


 


Sodium Chloride  10 ml  10/05/19 14:20  10/05/19 14:28





  Saline Flush  FLUSH   10 ml





  ASDIRECTED PRN   Administration





  Keep Vein Open   





     





     





     














Discontinued Medications














Generic Name Dose Route Start Last Admin





  Trade Name Freq  PRN Reason Stop Dose Admin


 


Aspirin  324 mg  10/05/19 14:20  10/05/19 14:27





  Aspirin  PO  10/05/19 14:21  324 mg





  ONETIME ONE   Administration





     





     





     





     














- Radiology Interpretation


Free Text/Narrative:: 


[facility]


Final Radiology Report  Call: [phone]


assistance Online chat: [email or web address]


Name: KORTNEY VINSON Age: 69Years F Date: 10/05/2019


MRN: [id number] SSN: -- : 1950


Study: XR CHEST 1 VIEW FRONTAL Requesting Physician: TANNER JOSE


Accession: [id number] Images: 1


Addl Studies:


Provided Clinical History:


Contrast: Contrast Medium:


Contrast Amount: Contrast Method:


CONFIDENTIALITY STATEMENT


This report is intended only for use by the referring physician, and only in 

accordance with law. If you received this in error, call [phone].


Page 1 of 1


PROCEDURE INFORMATION:


Exam: XR Chest, 1 View


Exam date and time: 10/5/2019 2:48 PM


Clinical history: 69 years old, female; Other: Chest pain


TECHNIQUE:


Imaging protocol: XR of the chest


Views: 1 view.


COMPARISON:


CR Chest 1V Frontal 2019 11:42 AM


FINDINGS:


Lungs: Nonspecific bibasilar consolidation is present, consistent with 

atelectasis, edema, or


pneumonia.


Pleural space: Unremarkable. No pleural effusion. No pneumothorax.


Heart/Mediastinum: The heart demonstrates mild diffuse enlargement.


Bones/joints: There are sternal wires consistent with previous sternotomy 

incision. The thoracic


spine demonstrates mild degenerative changes at multiple levels.


IMPRESSION:


Nonspecific bibasilar consolidation is present, consistent with atelectasis, 

edema, or pneumonia.


Thank you for allowing us to participate in the care of your patient.


Dictated and Authenticated by: Fredrick Zaidi DO


10/05/2019 3:00 PM Central Time ([place] & [place])





Departure





- Departure


Time of Disposition: 15:40


Disposition: Home, Self-Care 01


Condition: Good (dizziness)


Clinical Impression: 


 Dizziness, Atypical chest pain





Instructions:  Dizziness, Nonspecific Chest Pain, Easy-to-Read


Forms:  ED Department Discharge


Additional Instructions: 


Continue your current medications as prescribed.


Follow up in clinic for a Life Alert necklace or wrist band to push in case you 

have an emergency when you are alone.








- My Orders


Last 24 Hours: 


My Active Orders





10/05/19 14:20


EKG 12 Lead [EKG Documentation Completion] [RC] STAT 


Peripheral IV Care [RC] .AS DIRECTED 


Chest 1V Frontal [CR] Stat 


Sodium Chloride 0.9% [Saline Flush]   10 ml FLUSH ASDIRECTED PRN 


Peripheral IV Insertion Adult [OM.PC] Stat 





10/05/19 15:25


Orthostatic Vital Signs [RC] ASDIRECTED 














- Assessment/Plan


Last 24 Hours: 


My Active Orders





10/05/19 14:20


EKG 12 Lead [EKG Documentation Completion] [RC] STAT 


Peripheral IV Care [RC] .AS DIRECTED 


Chest 1V Frontal [CR] Stat 


Sodium Chloride 0.9% [Saline Flush]   10 ml FLUSH ASDIRECTED PRN 


Peripheral IV Insertion Adult [OM.PC] Stat 





10/05/19 15:25


Orthostatic Vital Signs [RC] ASDIRECTED 














I have read and agree with the documentation that has been completed regarding 

this visit. By signing this record, I attest that the documentation was 

completed in my physical presence and is an accurate record of the encounter.

## 2019-12-31 ENCOUNTER — HOSPITAL ENCOUNTER (EMERGENCY)
Dept: HOSPITAL 43 - DL.ED | Age: 69
Discharge: HOME | End: 2019-12-31
Payer: COMMERCIAL

## 2019-12-31 VITALS — HEART RATE: 79 BPM | DIASTOLIC BLOOD PRESSURE: 79 MMHG | SYSTOLIC BLOOD PRESSURE: 130 MMHG

## 2019-12-31 DIAGNOSIS — I25.2: ICD-10-CM

## 2019-12-31 DIAGNOSIS — I12.9: ICD-10-CM

## 2019-12-31 DIAGNOSIS — E11.22: ICD-10-CM

## 2019-12-31 DIAGNOSIS — Z79.899: ICD-10-CM

## 2019-12-31 DIAGNOSIS — N18.9: ICD-10-CM

## 2019-12-31 DIAGNOSIS — Z79.4: ICD-10-CM

## 2019-12-31 DIAGNOSIS — Z79.82: ICD-10-CM

## 2019-12-31 DIAGNOSIS — R07.81: Primary | ICD-10-CM

## 2019-12-31 LAB
ANION GAP SERPL CALC-SCNC: 16 MMOL/L
CHLORIDE SERPL-SCNC: 97 MMOL/L (ref 101–111)
SODIUM SERPL-SCNC: 132 MMOL/L (ref 135–145)

## 2019-12-31 NOTE — EDM.PDOC
<WarrenTito M - Last Filed: 12/31/19 18:53>





ED HPI GENERAL MEDICAL PROBLEM





- General


Chief Complaint: Chest Pain


Stated Complaint: CHEST PAINS


Time Seen by Provider: 12/31/19 18:48


Source of Information: Reports: Patient


History Limitations: Reports: No Limitations





- History of Present Illness


INITIAL COMMENTS - FREE TEXT/NARRATIVE: 





This 70 yo female patient reports to the ED with left sided chest pain that 

started yesterday. The patient reports increased pain with deep breathing. The 

patient denies any falls or trauma to the area. The patient reports she noticed 

the pain after coughing. The patient locates the pain under her left breast, 

but she is not able to push on the area to increase the pain. 


Onset Date: 12/30/19


Duration: Constant


Location: Reports: Chest (left chest)


Quality: Reports: Ache, Sharp, Stabbing


Severity: Moderate


Improves with: Reports: None


Worsens with: Reports: None


Context: Reports: Other


Associated Symptoms: Reports: No Other Symptoms


Treatments PTA: Reports: Acetaminophen


  ** Left Chest


Pain Score (Numeric/FACES): 6





- Related Data


 Allergies











Allergy/AdvReac Type Severity Reaction Status Date / Time


 


chocolate flavor Allergy  Hives Verified 12/31/19 17:58


 


diazepam [From Valium] Allergy  Confusion Verified 12/31/19 17:58


 


diphenhydramine Allergy  Confusion Verified 12/31/19 17:58





[From Benadryl]     


 


kiwi Allergy  Blisters Verified 12/31/19 17:58


 


lisinopril [From Prinivil] Allergy  Swollen Verified 12/31/19 17:58





   Tongue  


 


metformin [From Glucophage] Allergy  Cough Verified 12/31/19 17:58


 


pantoprazole Allergy  Other Verified 12/31/19 17:58


 


pineapple Allergy  Blisters Verified 12/31/19 17:58


 


BEE VENOM Allergy  Swelling Uncoded 12/31/19 17:58











Home Meds: 


 Home Meds





Acetaminophen [Tylenol Extra Strength] 1,000 mg PO Q6H PRN 09/23/16 [History]


Aspirin [Halfprin] 81 mg PO DAILY 09/23/16 [History]


Calcium Citrate/Vitamin D3 [Calcium Citrate + D] 1 tab PO DAILY 09/23/16 [

History]


Cholecalciferol (Vitamin D3) [Vitamin D3] 1,000 units PO DAILY 09/23/16 [History

]


Insulin Aspart [Novolog Flexpen] 14 units SQ ASDIRECTED 09/23/16 [History]


Insulin Detemir [Levemir Flextouch] 45 unit SQ BEDTIME 09/23/16 [History]


Loratadine [Claritin] 10 mg PO DAILY 09/23/16 [History]


Multivitamin [Multivitamins] 1 cap PO DAILY 09/23/16 [History]


atorvaSTATin [Lipitor] 40 mg PO BEDTIME 09/23/16 [History]


Docusate Sodium 200 mg PO BID PRN 08/07/19 [History]


Ferrous Sulfate 325 mg PO BID 08/07/19 [History]


Metoprolol Tartrate 12.5 mg PO BID 08/07/19 [History]


Omeprazole 20 mg PO DAILY 08/07/19 [History]


Saxagliptin HCl [Onglyza] 5 mg PO DAILY 08/07/19 [History]


traMADol [Ultram] 50 mg PO Q8H PRN 12/31/19 [History]











Past Medical History


HEENT History: Reports: Cataract, Impaired Vision


Other HEENT History: wears glasses


Cardiovascular History: Reports: CAD, High Cholesterol, Hypertension, MI


Respiratory History: Reports: None


Gastrointestinal History: Reports: GERD, Hiatal Hernia


Other Gastrointestinal History: SCHATZKI'S RING


Genitourinary History: Reports: Chronic Renal Insuffiency


Other Genitourinary History: RENAL STENT


OB/GYN History: Reports: Pregnancy


Musculoskeletal History: Reports: Other (See Below)


Other Musculoskeletal History: FRACTURED LEFT ANKLE X3


Neurological History: Reports: None


Psychiatric History: Reports: None


Endocrine/Metabolic History: Reports: Diabetes, Type II


Hematologic History: Reports: None


Immunologic History: Reports: None


Oncologic (Cancer) History: Reports: None


Dermatologic History: Reports: None





- Infectious Disease History


Infectious Disease History: Reports: Chicken Pox, Measles, Mumps





- Past Surgical History


Head Surgeries/Procedures: Reports: None


HEENT Surgical History: Reports: Cataract Surgery


Cardiovascular Surgical History: Reports: Coronary Artery Bypass, Coronary 

Artery Stent


Other Cardiovascular Surgeries/Procedures: ELECTROCARDIOGRAM


Respiratory Surgical History: Reports: None


GI Surgical History: Reports: EGD, Esophageal Dilatation


Neurological Surgical History: Reports: None


Musculoskeletal Surgical History: Reports: Arthroscopic Knee


Oncologic Surgical History: Reports: None


Dermatological Surgical History: Reports: None





Social & Family History





- Family History


Family Medical History: Noncontributory


Cardiac: Reports: High Cholesterol, Hypertension, MI


: Reports: Dialysis


Musculoskeletal: Reports: Arthritis


Endocrine/Metabolic: Reports: Diabetes, type II


Oncologic: Reports: Lung





- Tobacco Use


Smoking Status *Q: Never Smoker


Second Hand Smoke Exposure: Yes





- Caffeine Use


Caffeine Use: Reports: Coffee





- Recreational Drug Use


Recreational Drug Use: No





- Living Situation & Occupation


Living situation: Reports: with Family


Occupation: Retired





ED ROS GENERAL





- Review of Systems


Review Of Systems: Comprehensive ROS is negative, except as noted in HPI.





ED EXAM, GENERAL





- Physical Exam


Exam: See Below


Exam Limited By: No Limitations


General Appearance: Alert, WD/WN, Moderate Distress


Eye Exam: Bilateral Eye: EOMI, Normal Inspection, PERRL


Ears: Normal External Exam, Normal Canal, Hearing Grossly Normal, Normal TMs


Nose: Normal Inspection, Normal Mucosa, No Blood


Throat/Mouth: Normal Inspection, Normal Lips, Normal Teeth, Normal Gums, Normal 

Oropharynx, Normal Voice, No Airway Compromise


Head: Atraumatic, Normocephalic


Neck: Normal Inspection, Supple, Non-Tender, Full Range of Motion


Respiratory/Chest: No Respiratory Distress, Lungs Clear, Normal Breath Sounds, 

No Accessory Muscle Use, Other (left chest wall tenderness)


Cardiovascular: Normal Peripheral Pulses, Regular Rate, Rhythm, No Edema, No 

Gallop, No JVD, No Murmur, No Rub


GI/Abdominal: Normal Bowel Sounds, Soft, Non-Tender, No Organomegaly, No 

Distention, No Abnormal Bruit, No Mass


 (Female) Exam: Deferred


Rectal (Female) Exam: Deferred


Extremities: Normal Inspection, Normal Range of Motion, Non-Tender, Normal 

Capillary Refill, No Pedal Edema


Neurological: Alert, Oriented, CN II-XII Intact, Normal Cognition, Normal Gait, 

Normal Reflexes, No Motor/Sensory Deficits


Psychiatric: Normal Affect, Normal Mood


Skin Exam: Warm, Dry, Intact, Normal Color, No Rash


Lymphatic: No Adenopathy





Course





- Vital Signs


Last Recorded V/S: 





 Last Vital Signs











Temp  98.4 F   12/31/19 18:04


 


Pulse  79   12/31/19 18:04


 


Resp  17   12/31/19 18:04


 


BP  130/79   12/31/19 18:04


 


Pulse Ox  97   12/31/19 18:04














- Orders/Labs/Meds


Orders: 





 Active Orders 24 hr











 Category Date Time Status


 


 EKG Documentation Completion [RC] STAT Care  12/31/19 18:20 Active


 


 Chest 1V Frontal [CR] Urgent Exams  12/31/19 18:20 Taken











Labs: 





 Laboratory Tests











  12/31/19 12/31/19 Range/Units





  18:09 18:09 


 


WBC  7.2   (5.0-10.0)  10^3/uL


 


RBC  4.17 L   (4.2-5.4)  10^6/uL


 


Hgb  12.6   (12.0-16.0)  g/dL


 


Hct  36.1 L   (37.0-47.0)  %


 


MCV  86.6   ()  fL


 


MCH  30.2   (27.0-34.0)  pg


 


MCHC  34.9   (33.0-35.0)  g/dL


 


Plt Count  220   (150-450)  10^3/uL


 


Neut % (Auto)  72.8   (42.2-75.2)  %


 


Lymph % (Auto)  15.5 L   (20.5-50.1)  %


 


Mono % (Auto)  9.9 H   (2-8)  %


 


Eos % (Auto)  1.7   (1.0-3.0)  %


 


Baso % (Auto)  0.1   (0.0-1.0)  %


 


Sodium   132 L  (135-145)  mmol/L


 


Potassium   4.0  (3.6-5.0)  mmol/L


 


Chloride   97 L  (101-111)  mmol/L


 


Carbon Dioxide   23.0  (21.0-31.0)  mmol/L


 


Anion Gap   16.0  


 


BUN   14  (7-18)  mg/dL


 


Creatinine   1.0  (0.6-1.3)  mg/dL


 


Est Cr Clr Drug Dosing   40.63  mL/min


 


Estimated GFR (MDRD)   55  


 


BUN/Creatinine Ratio   14.00  


 


Glucose   293 H  ()  mg/dL


 


Calcium   9.1  (8.4-10.2)  mg/dl


 


Total Bilirubin   0.6  (0.2-1.0)  mg/dL


 


AST   20  (10-42)  IU/L


 


ALT   17  (10-60)  IU/L


 


Alkaline Phosphatase   92  ()  IU/L


 


Troponin I   < 0.02  (0.00-0.02)  ng/ml


 


Total Protein   7.5  (6.7-8.2)  g/dl


 


Albumin   3.8  (3.2-5.5)  g/dl


 


Globulin   3.7  


 


Albumin/Globulin Ratio   1.03  














Departure





- Departure


Disposition: Home, Self-Care 01


Clinical Impression: 


 Pleuritic chest pain





Forms:  ED Department Discharge


Additional Instructions: 


light activity


bland diet


tylenol every 4-6 hours as needed for discomfort


warm pack to chest area


follow up as needed








Sepsis Event Note





- Evaluation


Sepsis Screening Result: No Definite Risk





- Focused Exam


Vital Signs: 





 Vital Signs











  Temp Pulse Resp BP Pulse Ox


 


 12/31/19 18:04  98.4 F  79  17  130/79  97











Date Exam was Performed: 12/31/19


Time Exam was Performed: 18:53





<Prachi Ellis - Last Filed: 12/31/19 20:17>





Departure





- Departure


Time of Disposition: 20:14


Condition: Good





Sepsis Event Note





- Focused Exam


Date Exam was Performed: 12/31/19


Time Exam was Performed: 20:14

## 2020-08-21 ENCOUNTER — HOSPITAL ENCOUNTER (INPATIENT)
Dept: HOSPITAL 43 - DL.ED | Age: 70
LOS: 2 days | Discharge: HOME | DRG: 690 | End: 2020-08-23
Attending: INTERNAL MEDICINE | Admitting: INTERNAL MEDICINE
Payer: COMMERCIAL

## 2020-08-21 DIAGNOSIS — I25.2: ICD-10-CM

## 2020-08-21 DIAGNOSIS — E78.00: ICD-10-CM

## 2020-08-21 DIAGNOSIS — E11.22: ICD-10-CM

## 2020-08-21 DIAGNOSIS — Z98.49: ICD-10-CM

## 2020-08-21 DIAGNOSIS — E87.8: ICD-10-CM

## 2020-08-21 DIAGNOSIS — Z79.82: ICD-10-CM

## 2020-08-21 DIAGNOSIS — H54.7: ICD-10-CM

## 2020-08-21 DIAGNOSIS — Z91.013: ICD-10-CM

## 2020-08-21 DIAGNOSIS — Z91.02: ICD-10-CM

## 2020-08-21 DIAGNOSIS — K44.9: ICD-10-CM

## 2020-08-21 DIAGNOSIS — N18.9: ICD-10-CM

## 2020-08-21 DIAGNOSIS — K21.9: ICD-10-CM

## 2020-08-21 DIAGNOSIS — Z96.0: ICD-10-CM

## 2020-08-21 DIAGNOSIS — I25.10: ICD-10-CM

## 2020-08-21 DIAGNOSIS — M19.90: ICD-10-CM

## 2020-08-21 DIAGNOSIS — E87.1: ICD-10-CM

## 2020-08-21 DIAGNOSIS — Z95.1: ICD-10-CM

## 2020-08-21 DIAGNOSIS — Z95.5: ICD-10-CM

## 2020-08-21 DIAGNOSIS — K22.2: ICD-10-CM

## 2020-08-21 DIAGNOSIS — N39.0: Primary | ICD-10-CM

## 2020-08-21 DIAGNOSIS — Z88.8: ICD-10-CM

## 2020-08-21 DIAGNOSIS — E11.9: ICD-10-CM

## 2020-08-21 DIAGNOSIS — Z91.018: ICD-10-CM

## 2020-08-21 DIAGNOSIS — Z91.030: ICD-10-CM

## 2020-08-21 DIAGNOSIS — I12.9: ICD-10-CM

## 2020-08-21 DIAGNOSIS — Z79.4: ICD-10-CM

## 2020-08-21 DIAGNOSIS — I70.1: ICD-10-CM

## 2020-08-21 DIAGNOSIS — I10: ICD-10-CM

## 2020-08-21 DIAGNOSIS — E78.5: ICD-10-CM

## 2020-08-21 DIAGNOSIS — Z79.899: ICD-10-CM

## 2020-08-21 LAB
ANION GAP SERPL CALC-SCNC: 11.8 MEQ/L (ref 7–13)
CHLORIDE SERPL-SCNC: 87 MMOL/L (ref 98–107)
SODIUM SERPL-SCNC: 122 MMOL/L (ref 136–145)

## 2020-08-21 RX ADMIN — Medication PRN ML: at 15:33

## 2020-08-21 RX ADMIN — INSULIN GLARGINE SCH UNITS: 100 INJECTION, SOLUTION SUBCUTANEOUS at 22:01

## 2020-08-21 NOTE — HP
CHIEF COMPLAINT:  Dizziness and generalized weakness.

 

HISTORY OF PRESENT ILLNESS:  The patient is a 70-year-old female who was

admitted through the emergency room because the patient has not been feeling

well for the last 6 days and just feeling weak and gets dizzy when she gets up,

and for the last 2 days, she started having some nausea and vomiting.  She was

seen in the [facility] and she was diagnosed to have urinary tract

infection and low sodium and chloride.  The patient was brought in by ambulance

to the emergency room and she had a CAT scan of the head in the emergency room

and this did not show any acute findings.  Because of the low sodium and

chloride and urinary tract infection, she was admitted for further evaluation

and management.

 

REVIEW OF SYSTEMS:

The patient denies any diarrhea.  Denies any dysuria, fever, chills, chest pain,

orthopnea, PND.

 

PAST MEDICAL HISTORY:  Remarkable for type 2 diabetes mellitus, coronary artery

disease, hypertension, dyslipidemia, history of Schatzki ring, history of renal

stent for renal artery stenosis, and osteoarthritis.

 

FAMILY HISTORY:  Noncontributory.

 

HOME MEDICATIONS:  Tylenol, aspirin, calcium with vitamin D, insulin aspart,

insulin detemir, Claritin, multivitamins, Lipitor, docusate, ferrous sulfate,

metoprolol, omeprazole, Onglyza, and tramadol.

 

ALLERGIES:  Benadryl, kiwi, lisinopril, metformin, pantoprazole, pineapple, and

bee venom.

 

PHYSICAL EXAMINATION:

General:  The patient is alert and oriented, looks tired, but not in any acute

distress.

Vital Signs:  Blood pressure is 170/75, pulse of 60, respirations of 16,

temperature of 97.2, pulse ox is 99% on room air.

HEENT:  Normocephalic.  There are pink palpebral conjunctivae.  Sclerae

anicteric.

Neck:  No JVD.  No lymphadenopathy.

Heart:  Regular rate and rhythm.  Normal S1 and S2.  No gallops.  No rubs.

Lungs:  Equal bilaterally.  No crackles.  No wheezing.

Abdomen:  Soft, nontender.  Bowel sounds positive.

Extremities:  Negative for any pedal edema.  No calf tenderness.

 

LABORATORY WORKUP:  CBC:  WBC is 11.6 with 89.2 neutrophils, hemoglobin is 13.6,

hematocrit is 38, platelets are 192.  Comp panel:  Sodium is 122, chloride of

87, creatinine is 1.11, glucose is 297, magnesium is 1.7, alkaline phosphatase

128.  The rest of the panel unremarkable.  BNP is 164.  Troponin is 0.56.

 

ADMITTING DIAGNOSES:

1. Urinary tract infection.

2. Hyponatremia and hypochloremia.

3. Coronary artery disease status post coronary artery bypass graft.

4. Hypertension.

5. Type 2 diabetes mellitus.

 

TREATMENT PLAN:  The patient is going to be admitted to General Medicine floor.

She will be started on IV fluids and will replete sodium and chloride cautiously

and she will also be empirically started on IV antibiotics for her urinary tract

infection and she will be on sliding scale insulin and DVT prophylaxis, and the

rest of the management as necessary, and the patient is a full code.

 

DD:  08/21/2020 17:13:23

DT:  08/21/2020 22:12:12

[facility]

Job #:  [id number]

## 2020-08-21 NOTE — EDM.PDOC
Scribed by Анна Ndiaye 08/21/20 1610 for Christie Shepherd NP





ED HPI GENERAL MEDICAL PROBLEM





- General


Chief Complaint: Genitourinary Problem


Stated Complaint: IN BY AMBULANCE


Time Seen by Provider: 08/21/20 15:55


Source of Information: Reports: Patient, RN, RN Notes Reviewed


History Limitations: Reports: No Limitations





- History of Present Illness


INITIAL COMMENTS - FREE TEXT/NARRATIVE: 


Patient presents to ER per [facility] with complaint of 

feeling tired, complaint of a "bad cough" at nighttime, dizziness when up to go 

to the bathroom, nausea and vomiting. She was tested for COVID x1 month and was 

negative. She also complains of weakness with standing. She has history of 

diabetes mellitus--insulin dependent and 3 vessel CABG. She has had no diarrhea,

chest pains, shortness of breath, denies urinary symptoms, fever or chills. She 

is oriented x3. 


Patient was seen at [facility] clinic today, labs were done, found to 

have low sodium and a UTI.  [facility] reported neuro changes 

today, other than weakness no neuro changes noted.


Onset: Gradual


Duration: Getting Worse


Location: Reports: Generalized


Severity: Moderate


Improves with: Reports: None


Worsens with: Reports: None


Associated Symptoms: Reports: No Other Symptoms





- Related Data


                                    Allergies











Allergy/AdvReac Type Severity Reaction Status Date / Time


 


chocolate flavor Allergy  Hives Verified 08/21/20 15:09


 


diazepam [From Valium] Allergy  Confusion Verified 08/21/20 15:09


 


diphenhydramine Allergy  Confusion Verified 08/21/20 15:09





[From Benadryl]     


 


kiwi Allergy  Blisters Verified 08/21/20 15:09


 


lisinopril [From Prinivil] Allergy  Swollen Verified 08/21/20 15:09





   Tongue  


 


metformin [From Glucophage] Allergy  Cough Verified 08/21/20 15:09


 


pantoprazole Allergy  Other Verified 08/21/20 15:09


 


pineapple Allergy  Blisters Verified 08/21/20 15:09


 


BEE VENOM Allergy  Swelling Uncoded 08/21/20 15:09











Home Meds: 


                                    Home Meds





Acetaminophen [Tylenol Extra Strength] 1,000 mg PO Q6H PRN 09/23/16 [History]


Aspirin [Halfprin] 81 mg PO DAILY 09/23/16 [History]


Calcium Citrate/Vitamin D3 [Calcium Citrate + D] 1 tab PO DAILY 09/23/16 

[History]


Cholecalciferol (Vitamin D3) [Vitamin D3] 1,000 units PO DAILY 09/23/16 

[History]


Insulin Aspart [Novolog Flexpen] 14 units SQ ASDIRECTED 09/23/16 [History]


Insulin Detemir [Levemir Flextouch] 45 unit SQ BEDTIME 09/23/16 [History]


Loratadine [Claritin] 10 mg PO DAILY 09/23/16 [History]


Multivitamin [Multivitamins] 1 cap PO DAILY 09/23/16 [History]


atorvaSTATin [Lipitor] 40 mg PO BEDTIME 09/23/16 [History]


Docusate Sodium 200 mg PO BID PRN 08/07/19 [History]


Ferrous Sulfate 325 mg PO BID 08/07/19 [History]


Metoprolol Tartrate 12.5 mg PO BID 08/07/19 [History]


Omeprazole 20 mg PO DAILY 08/07/19 [History]


Saxagliptin HCl [Onglyza] 5 mg PO DAILY 08/07/19 [History]


traMADol [Ultram] 50 mg PO Q8H PRN 12/31/19 [History]











Past Medical History


HEENT History: Reports: Cataract, Impaired Vision


Other HEENT History: wears glasses


Cardiovascular History: Reports: CAD, High Cholesterol, Hypertension, MI


Respiratory History: Reports: None


Gastrointestinal History: Reports: GERD, Hiatal Hernia


Other Gastrointestinal History: SCHATZKI'S RING


Genitourinary History: Reports: Chronic Renal Insuffiency, Other (See Below)


Other Genitourinary History: RENAL STENT.  Renal artery stenosis


OB/GYN History: Reports: Pregnancy


Musculoskeletal History: Reports: Osteoarthritis, Other (See Below)


Other Musculoskeletal History: FRACTURED LEFT ANKLE X3


Neurological History: Reports: None


Psychiatric History: Reports: None


Endocrine/Metabolic History: Reports: Diabetes, Type II


Hematologic History: Reports: None


Immunologic History: Reports: None


Oncologic (Cancer) History: Reports: None


Dermatologic History: Reports: None





- Infectious Disease History


Infectious Disease History: Reports: Chicken Pox, Measles, Mumps





- Past Surgical History


Head Surgeries/Procedures: Reports: None


HEENT Surgical History: Reports: Cataract Surgery


Cardiovascular Surgical History: Reports: Coronary Artery Bypass, Coronary 

Artery Stent


Other Cardiovascular Surgeries/Procedures: ELECTROCARDIOGRAM


Respiratory Surgical History: Reports: None


GI Surgical History: Reports: EGD, Esophageal Dilatation


Neurological Surgical History: Reports: None


Musculoskeletal Surgical History: Reports: Arthroscopic Knee


Oncologic Surgical History: Reports: None


Dermatological Surgical History: Reports: None





Social & Family History





- Family History


Family Medical History: Noncontributory


Cardiac: Reports: High Cholesterol, Hypertension, MI


: Reports: Dialysis


Musculoskeletal: Reports: Arthritis


Endocrine/Metabolic: Reports: Diabetes, type II


Oncologic: Reports: Lung





- Tobacco Use


Smoking Status *Q: Never Smoker





- Caffeine Use


Caffeine Use: Reports: Coffee





- Recreational Drug Use


Recreational Drug Use: No





- Living Situation & Occupation


Living situation: Reports: with Family


Occupation: Retired





ED ROS GENERAL





- Review of Systems


Review Of Systems: Comprehensive ROS is negative, except as noted in HPI.





ED EXAM, RENAL/





- Physical Exam


Exam: See Below


Exam Limited By: No Limitations


General Appearance: Alert, WD/WN, No Apparent Distress


Eye Exam: Bilateral Eye: EOMI, Normal Inspection, PERRL


Ears: Normal External Exam, Normal Canal, Hearing Grossly Normal, Normal TMs


Nose: Normal Inspection, Normal Mucosa, No Blood


Throat/Mouth: Normal Inspection, Normal Lips, Normal Teeth, Normal Gums, Normal 

Oropharynx, Normal Voice, No Airway Compromise


Head: Atraumatic, Normocephalic


Neck: Normal Inspection, Supple, Non-Tender, Full Range of Motion


Respiratory/Chest: Other (diminished)


Cardiovascular: Regular Rate, Rhythm


GI/Abdominal: Normal Bowel Sounds, Soft, Non-Tender, No Organomegaly, No 

Distention, No Abnormal Bruit, No Mass


 (Female) Exam: Deferred


Rectal (Female) Exam: Deferred


Back Exam: Normal Inspection, Full Range of Motion, NT


Extremities: Normal Inspection, Normal Range of Motion, Non-Tender, Normal 

Capillary Refill, No Pedal Edema


Neurological: Alert, Oriented, CN II-XII Intact, Normal Cognition, Normal Gait, 

Normal Reflexes, No Motor/Sensory Deficits


Psychiatric: Normal Affect, Normal Mood


Skin Exam: Warm, Dry, Intact, Normal Color, No Rash


Lymphatic: No Adenopathy





Course





- Vital Signs


Last Recorded V/S: 


                                Last Vital Signs











Temp  97.2 F   08/21/20 15:24


 


Pulse  60   08/21/20 15:24


 


Resp  16   08/21/20 15:24


 


BP  170/75 H  08/21/20 15:24


 


Pulse Ox  99   08/21/20 15:24














- Orders/Labs/Meds


Orders: 


                               Active Orders 24 hr











 Category Date Time Status


 


 Admission Diagnosis [ADT] Stat ADT  08/21/20 16:25 Ordered


 


 Admission Status [Patient Status] [ADT] Routine ADT  08/21/20 16:25 Active


 


 EKG Documentation Completion [RC] STAT Care  08/21/20 15:20 Active


 


 Peripheral IV Care [RC] .AS DIRECTED Care  08/21/20 15:21 Active


 


 DRUG SCREEN URINE BIORAD [URCHEM] Stat Lab  08/21/20 15:20 Ordered


 


 UA RFX SHEREEN AND CULT IF INDIC [URIN] Stat Lab  08/21/20 15:21 Ordered


 


 Sodium Chloride 0.9% [Normal Saline] 1,000 ml Med  08/21/20 16:22 Active





 IV CONTINUOUS   


 


 Sodium Chloride 0.9% [Saline Flush] Med  08/21/20 15:20 Active





 10 ml FLUSH ASDIRECTED PRN   


 


 cefTRIAXone [Rocephin] 1 gm Med  08/21/20 16:23 Active





 Sodium Chloride 0.9% [Normal Saline] 50 ml   





 IV ONETIME   


 


 Peripheral IV Insertion Adult [OM.PC] Stat Oth  08/21/20 15:20 Ordered








                                Medication Orders





Sodium Chloride (Normal Saline)  1,000 mls @ 150 mls/hr IV CONTINUOUS ONE


   Stop: 08/21/20 23:01


Ceftriaxone Sodium 1 gm/ (Sodium Chloride)  50 mls @ 100 mls/hr IV ONETIME ONE


   Stop: 08/21/20 16:52


Sodium Chloride (Saline Flush)  10 ml FLUSH ASDIRECTED PRN


   PRN Reason: Keep Vein Open


   Last Admin: 08/21/20 15:33  Dose: 10 ml


   Documented by: OPAL








Labs: 


                                Laboratory Tests











  08/21/20 08/21/20 Range/Units





  15:27 15:27 


 


WBC  11.6 H   (5.0-10.0)  10^3/uL


 


RBC  4.60   (4.2-5.4)  10^6/uL


 


Hgb  13.6   (12.0-16.0)  g/dL


 


Hct  38.0   (37.0-47.0)  %


 


MCV  82.6  D   ()  fL


 


MCH  29.6   (27.0-34.0)  pg


 


MCHC  35.8 H   (33.0-35.0)  g/dL


 


Plt Count  192   (150-450)  10^3/uL


 


Neut % (Auto)  89.2 H   (42.2-75.2)  %


 


Lymph % (Auto)  5.3 L   (20.5-50.1)  %


 


Mono % (Auto)  5.2   (2-8)  %


 


Eos % (Auto)  0.2 L   (1.0-3.0)  %


 


Baso % (Auto)  0.1   (0.0-1.0)  %


 


Sodium   122 L  (136-145)  mmol/L


 


Potassium   4.8  (3.5-5.1)  mmol/L


 


Chloride   87 L  ()  mmol/L


 


Carbon Dioxide   28  (21-32)  mmol/L


 


Anion Gap   11.8  (7-13)  mEq/L


 


BUN   12  (7-18)  mg/dL


 


Creatinine   1.11 H  (0.55-1.02)  mg/dL


 


Est Cr Clr Drug Dosing   36.09  mL/min


 


Estimated GFR (MDRD)   49  


 


BUN/Creatinine Ratio   10.8  (No establ ref range)  


 


Glucose   297 H  (74-99)  mg/dL


 


Calcium   9.1  (8.5-10.1)  mg/dL


 


Magnesium   1.7 L  (1.8-2.4)  mg/dL


 


Total Bilirubin   0.9  (0.2-1.0)  mg/dL


 


AST   29  (15-37)  U/L


 


ALT   24  (14-59)  U/L


 


Alkaline Phosphatase   128 H  ()  U/L


 


Troponin I   0.056  (0.000-0.056)  ng/mL


 


B-Natriuretic Peptide   164 H  (0-100)  pg/ml


 


Total Protein   8.4 H  (6.4-8.2)  g/dL


 


Albumin   3.6  (3.4-5.0)  g/dL


 


Globulin   4.8  


 


Albumin/Globulin Ratio   0.8  


 


Ethyl Alcohol   < 3  (0)  mg/dL











Meds: 


Medications











Generic Name Dose Route Start Last Admin





  Trade Name Freq  PRN Reason Stop Dose Admin


 


Sodium Chloride  1,000 mls @ 150 mls/hr  08/21/20 16:22 





  Normal Saline  IV  08/21/20 23:01 





  CONTINUOUS ONE  


 


Ceftriaxone Sodium 1 gm/  50 mls @ 100 mls/hr  08/21/20 16:23 





  Sodium Chloride  IV  08/21/20 16:52 





  ONETIME ONE  


 


Sodium Chloride  10 ml  08/21/20 15:20  08/21/20 15:33





  Saline Flush  FLUSH   10 ml





  ASDIRECTED PRN   Administration





  Keep Vein Open  














Discontinued Medications














Generic Name Dose Route Start Last Admin





  Trade Name Freq  PRN Reason Stop Dose Admin


 


Ondansetron HCl  4 mg  08/21/20 15:29  08/21/20 15:33





  Zofran  IV  08/21/20 15:30  4 mg





  ONETIME ONE   Administration














- Radiology Interpretation


Free Text/Narrative:: 


Head CT:  No new evidence of intracranial mass, hydrocephalus or bleed. Multi-

infarct ischemic disease. See rad report. 





- Re-Assessments/Exams


Free Text/Narrative Re-Assessment/Exam: 





08/21/20 16:45


Discussed patient case with Dr. Marcial who agreed to accept the patient for 

inpatient admission.








Departure





- Departure


Time of Disposition: 16:45


Disposition: Admitted As Inpatient 67


Condition: Fair


Clinical Impression: 


 UTI, Urinary tract infectious disease, Hyponatremia








- Discharge Information


*PRESCRIPTION DRUG MONITORING PROGRAM REVIEWED*: No


*COPY OF PRESCRIPTION DRUG MONITORING REPORT IN PATIENT SHAKIR: No


Forms:  ED Department Discharge





Sepsis Event Note (ED)





- Evaluation


Sepsis Screening Result: No Definite Risk





- Focused Exam


Vital Signs: 


                                   Vital Signs











  Temp Pulse Resp BP Pulse Ox


 


 08/21/20 15:24  97.2 F  60  16  170/75 H  99














- My Orders


Last 24 Hours: 


My Active Orders





08/21/20 15:20


EKG Documentation Completion [RC] STAT 


DRUG SCREEN URINE BIORAD [URCHEM] Stat 


Sodium Chloride 0.9% [Saline Flush]   10 ml FLUSH ASDIRECTED PRN 


Peripheral IV Insertion Adult [OM.PC] Stat 





08/21/20 15:21


Peripheral IV Care [RC] .AS DIRECTED 


UA RFX SHEREEN AND CULT IF INDIC [URIN] Stat 





08/21/20 16:22


Sodium Chloride 0.9% [Normal Saline] 1,000 ml IV CONTINUOUS 





08/21/20 16:23


cefTRIAXone [Rocephin] 1 gm   Sodium Chloride 0.9% [Normal Saline] 50 ml IV 

ONETIME 





08/21/20 16:25


Admission Diagnosis [ADT] Stat 


Admission Status [Patient Status] [ADT] Routine 














- Assessment/Plan


Last 24 Hours: 


My Active Orders





08/21/20 15:20


EKG Documentation Completion [RC] STAT 


DRUG SCREEN URINE BIORAD [URCHEM] Stat 


Sodium Chloride 0.9% [Saline Flush]   10 ml FLUSH ASDIRECTED PRN 


Peripheral IV Insertion Adult [OM.PC] Stat 





08/21/20 15:21


Peripheral IV Care [RC] .AS DIRECTED 


UA RFX SHEREEN AND CULT IF INDIC [URIN] Stat 





08/21/20 16:22


Sodium Chloride 0.9% [Normal Saline] 1,000 ml IV CONTINUOUS 





08/21/20 16:23


cefTRIAXone [Rocephin] 1 gm   Sodium Chloride 0.9% [Normal Saline] 50 ml IV ONET

[place] 





08/21/20 16:25


Admission Diagnosis [ADT] Stat 


Admission Status [Patient Status] [ADT] Routine 














I have read and agree with the documentation that has been completed regarding 

this visit. By signing this record, I attest that the documentation was 

completed in my physical presence and is an accurate record of the encounter.

## 2020-08-21 NOTE — CT
EXAMINATION: Head wo Cont  SEX: Female   AGE: 70 years

 

CLINICAL HISTORY: 70-year-old hypertensive 158 pound diabetic female ALTERED

MENTAL STATUS. Comparison CT scan head 20 February 2013 revealed "no evidence

infarct, hemorrhage or mass".

 

Scan technique: Volume acquisition of data emergency unenhanced CT scan of the

head and brain obtained with the patient lying supine on the Siemens multislice

scanner [place], [place]. All data archived in

the PACS system for storage, reformatting axial/sagittal/coronal planes and

study.

 

Interpretation: Generalized atrophy increased but exam otherwise unchanged since

28 February 2013.

 

1. Uniformly thick bony calvarium. No sign of pathologic skeletal lesion, skull

fracture, underlying brain contusion or epidural/subdural hematoma.

2. Symmetric clear pneumatization of the paranasal and mastoid sinuses. No

inflammatory changes.

3. No supratentorial or posterior fossa mass lesion. No hydrocephalus.

4. Atrophy and... *multiple scattered areas of decreased attenuation throughout

the periventricular white matter characteristic of multi-infarct ischemic

disease.

5. No sign of acute intracerebral, intraventricular or subarachnoid bleed.

6. Cerebellum and brainstem unremarkable.

 

CONCLUSION: No new evidence intracranial mass, hydrocephalus or bleed.

  Multi-infarct ischemic disease.

## 2020-08-22 LAB
ANION GAP SERPL CALC-SCNC: 11 MEQ/L (ref 7–13)
CHLORIDE SERPL-SCNC: 97 MMOL/L (ref 98–107)
SODIUM SERPL-SCNC: 132 MMOL/L (ref 136–145)

## 2020-08-22 RX ADMIN — Medication PRN ML: at 17:05

## 2020-08-22 RX ADMIN — INSULIN GLARGINE SCH UNITS: 100 INJECTION, SOLUTION SUBCUTANEOUS at 20:57

## 2020-08-22 RX ADMIN — OMEPRAZOLE SCH MG: 20 CAPSULE, DELAYED RELEASE ORAL at 05:03

## 2020-08-22 RX ADMIN — Medication SCH TAB: at 08:29

## 2020-08-22 NOTE — PN
DATE:  08/22/2020

 

SUBJECTIVE:  The patient this morning is feeling slightly better and she

mentioned that she is no longer dizzy and no longer shaky with standing and

ambulation and her appetite is slowly improving.  The patient currently denies

any ongoing complaints.  She denies any chest pain, shortness of breath,

abdominal pain, nor any other complaints.

 

LABORATORY DATA:  Lab workup this morning, basic metabolic panel sodium is 132

(improving), chloride is 97, creatinine is 1.34, and glucose is 213.  The rest

of the panel unremarkable.

 

OBJECTIVE:  Vital Signs:  Blood pressure is 112/58, pulse 79, respirations of

18, temperature of 98.2, and saturation is 96.

Heart:  Regular rate and rhythm.  Normal S1 and S2.  No gallops.  No rubs.

Lungs:  Equal bilaterally.  No crackles.  No wheezing.

Abdomen:  Soft and nontender.  Bowel sounds positive.

Extremities:  Negative for any pedal edema.  No calf tenderness.

 

MEDICATIONS:  Reviewed.

 

PLAN:  We will discontinue her IV fluids.  We will continue with her sodium

chloride tablets and continue with the rest of her medication including

ceftriaxone.

 

DD:  08/22/2020 08:34:30

DT:  08/22/2020 08:49:38

[facility]

Job #:  [id number]

## 2020-08-23 VITALS — SYSTOLIC BLOOD PRESSURE: 148 MMHG | DIASTOLIC BLOOD PRESSURE: 61 MMHG | HEART RATE: 63 BPM

## 2020-08-23 LAB
ANION GAP SERPL CALC-SCNC: 10.8 MEQ/L (ref 7–13)
CHLORIDE SERPL-SCNC: 101 MMOL/L (ref 98–107)
SODIUM SERPL-SCNC: 136 MMOL/L (ref 136–145)

## 2020-08-23 RX ADMIN — OMEPRAZOLE SCH MG: 20 CAPSULE, DELAYED RELEASE ORAL at 05:04

## 2020-08-23 RX ADMIN — Medication SCH TAB: at 08:15

## 2020-08-23 NOTE — DISCH
FINAL DIAGNOSES:

1. Hyponatremia and hypochloremia.

2. Urinary tract infection.

3. Coronary artery disease, status post coronary artery bypass graft.

4. Hypertension.

5. Type 2 diabetes mellitus.

 

BRIEF HISTORY OF PRESENT ILLNESS:  Please see H and P.

 

PERTINENT LABORATORY DATA, X-RAY, AND OTHER TESTS ON ADMISSION:  See H and P.

 

HOSPITAL COURSE:  The patient was admitted to General Medicine floor.  She was

started on IV fluids with normal saline and was also started empirically on IV

antibiotics with Rocephin for her urinary tract infection.  She was also started

on sodium chloride tablets for the hyponatremia and hypochloremia.  She was

placed on sliding scale insulin for her diabetes and she was also placed on DVT

prophylaxis.  The patient did well during the hospitalization.  The patient's

serum sodium slowly improved, and she was feeling much better, and she was back

to her baseline, and she was then discharged.

 

CONDITION ON DISCHARGE:  Improved.

 

DISCHARGE INSTRUCTIONS:  She is going to be resumed on all previous home

medications, and she is going to follow up at [facility] in 1 week for

recheck basic metabolic panel and urinalysis.

 

DD:  08/23/2020 08:56:03

DT:  08/23/2020 11:04:08

[facility]

Job #:  [id number]

## 2020-08-23 NOTE — PN
DATE:  08/23/2020

 

SUBJECTIVE:  The patient continues to do well and the patient is back to her

baseline and appetite is good.  Denies any fever, chills, chest pain, abdominal

pain, or any other complaints.

 

LABORATORY DATA:  Lab workup this morning, sodium is 136 which is now within

normal limits.  Creatinine is 1.4, glucose is 195, calcium 8.4.

 

Preliminary results of the urine culture showed mixed karena suggestive of

contamination.

 

OBJECTIVE:  Vital Signs:  Blood pressure is 148/61, pulse of 63, respiration of

16, temperature of 98.1, saturation is 98%.

Heart:  Regular rate and rhythm.  Normal S1 and S2.  No gallops.  No rubs.

Lungs:  Equal bilaterally.  No crackles.  No wheezing.

Abdomen:  Soft, nontender.  Bowel sounds positive.

Extremities:  Negative for any pedal edema.  No calf tenderness.

 

PLAN:  We will discharge the patient home today and we will resume her previous

home medication and we will have her follow up at [facility] in 1 week.

 

DD:  08/23/2020 08:53:28

DT:  08/23/2020 10:50:02

[facility]

Job #:  [id number]

## 2020-09-12 ENCOUNTER — HOSPITAL ENCOUNTER (OUTPATIENT)
Dept: HOSPITAL 43 - DL.ED | Age: 70
Setting detail: OBSERVATION
LOS: 2 days | Discharge: HOME | End: 2020-09-14
Attending: HOSPITALIST | Admitting: HOSPITALIST
Payer: MEDICARE

## 2020-09-12 DIAGNOSIS — Z79.899: ICD-10-CM

## 2020-09-12 DIAGNOSIS — Z95.5: ICD-10-CM

## 2020-09-12 DIAGNOSIS — Z79.4: ICD-10-CM

## 2020-09-12 DIAGNOSIS — R42: Primary | ICD-10-CM

## 2020-09-12 DIAGNOSIS — Z79.82: ICD-10-CM

## 2020-09-12 DIAGNOSIS — E11.22: ICD-10-CM

## 2020-09-12 DIAGNOSIS — Z95.1: ICD-10-CM

## 2020-09-12 DIAGNOSIS — E87.1: ICD-10-CM

## 2020-09-12 DIAGNOSIS — Z91.030: ICD-10-CM

## 2020-09-12 DIAGNOSIS — Z88.8: ICD-10-CM

## 2020-09-12 DIAGNOSIS — E78.00: ICD-10-CM

## 2020-09-12 DIAGNOSIS — N18.9: ICD-10-CM

## 2020-09-12 DIAGNOSIS — R11.2: ICD-10-CM

## 2020-09-12 PROCEDURE — G0378 HOSPITAL OBSERVATION PER HR: HCPCS

## 2020-09-12 NOTE — EDM.PDOC
ED HPI GENERAL MEDICAL PROBLEM





- General


Chief Complaint: General


Stated Complaint: AMBULANCE


Time Seen by Provider: 09/12/20 23:34


Source of Information: Reports: Patient


History Limitations: Reports: No Limitations





- History of Present Illness


INITIAL COMMENTS - FREE TEXT/NARRATIVE: 





c/o recurrent nausea with dizziness. was here for same last month and admitted 

for eval and d/c. 





- Related Data


                                    Allergies











Allergy/AdvReac Type Severity Reaction Status Date / Time


 


chocolate flavor Allergy  Hives Verified 08/21/20 15:09


 


diazepam [From Valium] Allergy  Confusion Verified 08/21/20 15:09


 


diphenhydramine Allergy  Confusion Verified 08/21/20 15:09





[From Benadryl]     


 


kiwi Allergy  Blisters Verified 08/21/20 15:09


 


lisinopril [From Prinivil] Allergy  Swollen Verified 08/21/20 15:09





   Tongue  


 


metformin [From Glucophage] Allergy  Cough Verified 08/21/20 15:09


 


pantoprazole Allergy  Other Verified 08/21/20 15:09


 


pineapple Allergy  Blisters Verified 08/21/20 15:09


 


BEE VENOM Allergy  Swelling Uncoded 08/21/20 15:09











Home Meds: 


                                    Home Meds





Acetaminophen [Tylenol Extra Strength] 1,000 mg PO BID PRN 09/23/16 [History]


Aspirin [Halfprin] 81 mg PO DAILY 09/23/16 [History]


Calcium Citrate/Vitamin D3 [Calcium Citrate + D] 1 tab PO DAILY 09/23/16 

[History]


Insulin Aspart [Novolog Flexpen] 10 - 20 units SQ TIDMEALS 09/23/16 [History]


Insulin Detemir [Levemir Flextouch] 45 unit SQ BEDTIME 09/23/16 [History]


Multivitamin [Multivitamins] 1 cap PO DAILY 09/23/16 [History]


atorvaSTATin [Lipitor] 40 mg PO BEDTIME 09/23/16 [History]


Docusate Sodium 200 mg PO BID PRN 08/07/19 [History]


Metoprolol Tartrate 12.5 mg PO BID 08/07/19 [History]


Omeprazole 20 mg PO DAILY 08/07/19 [History]


Alogliptin Benzoate [Alogliptin] 12.5 mg PO DAILY 08/21/20 [History]


Betamethasone Dipropionate [Betamethasone Diprop Augmented] 1 applic TOP BID 

08/21/20 [History]


Cholecalciferol (Vitamin D3) [Vitamin D3] 25 mcg PO DAILY 08/21/20 [History]


Fluticasone Propionate [Flonase] 1 spray NASBOTH BEDTIME 08/21/20 [History]


Losartan [Cozaar] 25 mg PO DAILY 08/21/20 [History]


Magnesium Oxide 400 mg PO DAILY 08/21/20 [History]


Montelukast [Singulair] 10 mg PO DAILY 08/21/20 [History]











Past Medical History


HEENT History: Reports: Cataract, Impaired Vision


Other HEENT History: wears glasses


Cardiovascular History: Reports: CAD, High Cholesterol, Hypertension, MI


Respiratory History: Reports: None


Gastrointestinal History: Reports: GERD, Hiatal Hernia


Other Gastrointestinal History: SCHATZKI'S RING


Genitourinary History: Reports: Chronic Renal Insuffiency


Other Genitourinary History: RENAL STENT


OB/GYN History: Reports: Pregnancy


Musculoskeletal History: Reports: Other (See Below)


Other Musculoskeletal History: FRACTURED LEFT ANKLE X3


Neurological History: Reports: None


Psychiatric History: Reports: None


Endocrine/Metabolic History: Reports: Diabetes, Type II


Hematologic History: Reports: None


Immunologic History: Reports: None


Oncologic (Cancer) History: Reports: None


Dermatologic History: Reports: None





- Infectious Disease History


Infectious Disease History: Reports: Chicken Pox, Measles, Mumps





- Past Surgical History


Head Surgeries/Procedures: Reports: None


HEENT Surgical History: Reports: Cataract Surgery


Cardiovascular Surgical History: Reports: Coronary Artery Bypass, Coronary 

Artery Stent


Other Cardiovascular Surgeries/Procedures: ELECTROCARDIOGRAM


Respiratory Surgical History: Reports: None


GI Surgical History: Reports: EGD, Esophageal Dilatation


Neurological Surgical History: Reports: None


Musculoskeletal Surgical History: Reports: Arthroscopic Knee


Oncologic Surgical History: Reports: None


Dermatological Surgical History: Reports: None





Social & Family History





- Family History


Family Medical History: Noncontributory


Cardiac: Reports: High Cholesterol, Hypertension, MI


: Reports: Dialysis


Musculoskeletal: Reports: Arthritis


Endocrine/Metabolic: Reports: Diabetes, type II


Oncologic: Reports: Lung





- Caffeine Use


Caffeine Use: Reports: Coffee





- Living Situation & Occupation


Living situation: Reports: with Family


Occupation: Retired





ED ROS GENERAL





- Review of Systems


Review Of Systems: Comprehensive ROS is negative, except as noted in HPI.





ED EXAM, GENERAL





- Physical Exam


Exam: See Below


Exam Limited By: No Limitations


General Appearance: Alert, WD/WN, Mild Distress, Other (discomfort)


Ears: Hearing Grossly Normal


Throat/Mouth: Normal Voice, No Airway Compromise


Head: Atraumatic


Neck: Non-Tender, Full Range of Motion





Course





- Vital Signs


Last Recorded V/S: 


                                Last Vital Signs











Temp  35.9 C L  09/12/20 23:35


 


Pulse  66   09/12/20 23:35


 


Resp  15   09/12/20 23:35


 


BP  189/76 H  09/12/20 23:35


 


Pulse Ox  100   09/12/20 23:35














- Orders/Labs/Meds


Labs: 


                                Laboratory Tests











  09/12/20 09/12/20 09/12/20 Range/Units





  23:42 23:42 23:56 


 


WBC  6.8    (5.0-10.0)  10^3/uL


 


RBC  4.07 L    (4.2-5.4)  10^6/uL


 


Hgb  12.1  D    (12.0-16.0)  g/dL


 


Hct  34.1 L    (37.0-47.0)  %


 


MCV  83.8    ()  fL


 


MCH  29.7    (27.0-34.0)  pg


 


MCHC  35.5 H    (33.0-35.0)  g/dL


 


Plt Count  214    (150-450)  10^3/uL


 


Neut % (Auto)  60.2    (42.2-75.2)  %


 


Lymph % (Auto)  28.4    (20.5-50.1)  %


 


Mono % (Auto)  7.8    (2-8)  %


 


Eos % (Auto)  3.0    (1.0-3.0)  %


 


Baso % (Auto)  0.6    (0.0-1.0)  %


 


Sodium   125 L D   (136-145)  mmol/L


 


Potassium   3.7   (3.5-5.1)  mmol/L


 


Chloride   90 L   ()  mmol/L


 


Carbon Dioxide   28   (21-32)  mmol/L


 


Anion Gap   10.7   (7-13)  mEq/L


 


BUN   10   (7-18)  mg/dL


 


Creatinine   1.03 H   (0.55-1.02)  mg/dL


 


Est Cr Clr Drug Dosing   38.90   mL/min


 


Estimated GFR (MDRD)   53   


 


BUN/Creatinine Ratio   9.7   (No establ ref range)  


 


Glucose   111 H   (74-99)  mg/dL


 


Calcium   8.7   (8.5-10.1)  mg/dL


 


Total Bilirubin   0.6   (0.2-1.0)  mg/dL


 


AST   23   (15-37)  U/L


 


ALT   25   (14-59)  U/L


 


Alkaline Phosphatase   129 H   ()  U/L


 


Total Protein   7.5   (6.4-8.2)  g/dL


 


Albumin   3.3 L   (3.4-5.0)  g/dL


 


Globulin   4.2   


 


Albumin/Globulin Ratio   0.79   


 


Urine Color    Straw  (YELLOW)  


 


Urine Appearance    Slightly cloudy  (CLEAR)  


 


Urine pH    6.5  (5.0-9.0)  


 


Ur Specific Gravity    1.010  (1.005-1.030)  


 


Urine Protein    Trace H  (NEGATIVE)  


 


Urine Glucose (UA)    Negative  (NEGATIVE)  


 


Urine Ketones    Negative  (NEGATIVE)  


 


Urine Occult Blood    Trace-intact H  (NEGATIVE)  


 


Urine Nitrite    Negative  (NEGATIVE)  


 


Urine Bilirubin    Negative  (NEGATIVE)  


 


Urine Urobilinogen    0.2  (0.2-1.0)  mg/dL


 


Ur Leukocyte Esterase    Negative  (NEGATIVE)  


 


Urine RBC    0-5  /HPF


 


Urine WBC    0-5  (0-5/HPF)  /HPF


 


Ur Epithelial Cells    Few  (NOT SEEN)  /HPF


 


Amorphous Sediment    Few  (NOT SEEN)  /HPF


 


Urine Bacteria    Few  (0-FEW/HPF)  /HPF


 


Urine Mucus    Few H  (NOT SEEN)  /LPF











Meds: 


Medications














Discontinued Medications














Generic Name Dose Route Start Last Admin





  Trade Name Vincentq  PRN Reason Stop Dose Admin


 


Meclizine HCl  12.5 mg  09/12/20 23:33  09/12/20 23:45





  Antivert  PO  09/12/20 23:34  12.5 mg





  ONETIME ONE   Administration


 


Ondansetron HCl  4 mg  09/12/20 23:33  09/12/20 23:44





  Zofran  IVPUSH  09/12/20 23:34  4 mg





  ONETIME ONE   Administration














- Re-Assessments/Exams


Free Text/Narrative Re-Assessment/Exam: 





09/13/20 00:41


case discussed with Dr Steward who kindly admitted pt to observation.





Departure





- Departure


Time of Disposition: 00:41


Disposition: Refer to Observation


Condition: Good


Clinical Impression: 


 Hyponatremia, Dizziness





Vomiting


Qualifiers:


 Vomiting type: unspecified Vomiting Intractability: non-intractable Nausea 

presence: with nausea Qualified Code(s): R11.2 - Nausea with vomiting, 

unspecified








- Discharge Information


Forms:  ED Department Discharge





Sepsis Event Note (ED)





- Focused Exam


Vital Signs: 


                                   Vital Signs











  Temp Pulse Resp BP Pulse Ox


 


 09/12/20 23:35  35.9 C L  66  15  189/76 H  100

## 2020-09-13 LAB
ANION GAP SERPL CALC-SCNC: 10.7 MEQ/L (ref 7–13)
ANION GAP SERPL CALC-SCNC: 11 MEQ/L (ref 7–13)
CHLORIDE SERPL-SCNC: 90 MMOL/L (ref 98–107)
CHLORIDE SERPL-SCNC: 95 MMOL/L (ref 98–107)
SODIUM SERPL-SCNC: 125 MMOL/L (ref 136–145)
SODIUM SERPL-SCNC: 130 MMOL/L (ref 136–145)

## 2020-09-13 RX ADMIN — VITAMIN D, TAB 1000IU (100/BT) SCH MCG: 25 TAB at 11:02

## 2020-09-13 RX ADMIN — METOPROLOL TARTRATE SCH MG: 25 TABLET, FILM COATED ORAL at 11:02

## 2020-09-13 RX ADMIN — ASPIRIN SCH MG: 81 TABLET ORAL at 11:04

## 2020-09-13 RX ADMIN — METOPROLOL TARTRATE SCH MG: 25 TABLET, FILM COATED ORAL at 21:06

## 2020-09-13 RX ADMIN — THERA TABS SCH: TAB at 11:06

## 2020-09-13 RX ADMIN — Medication SCH MG: at 11:03

## 2020-09-13 NOTE — PCM.HP
H&P History of Present Illness





- General


Date of Service: 09/13/20


Admit Problem/Dx: 


                           Admission Diagnosis/Problem





Admission Diagnosis/Problem      Dizziness and giddiness








Source of Information: Patient





- History of Present Illness


Initial Comments - Free Text/Narative: 





The patient is a 70-year-old female with medical history of coronary artery 

disease, chronic kidney disease, diabetes mellitus, hypertension. The patient 

presented to the emergency room with complaint of dizziness which has been going

on for the past 2 days. She describes it as lightheadedness and it happens 

mostly when she stands up. Denies associated vertigo but feels that her balance 

has been sometimes off. No weakness of the extremities. Patient was having 

nausea and vomiting since yesterday. Vomited multiple times. No fever chills or 

rigors. Was seen in emergency room a few days ago with similar condition..


  ** Right Shoulder


Pain Score (Numeric/FACES): 4





- Related Data


Allergies/Adverse Reactions: 


                                    Allergies











Allergy/AdvReac Type Severity Reaction Status Date / Time


 


chocolate flavor Allergy  Hives Verified 09/13/20 01:20


 


diazepam [From Valium] Allergy  Confusion Verified 09/13/20 01:20


 


diphenhydramine Allergy  Confusion Verified 09/13/20 01:20





[From Benadryl]     


 


kiwi Allergy  Blisters Verified 09/13/20 01:20


 


lisinopril [From Prinivil] Allergy  Swollen Verified 09/13/20 01:20





   Tongue  


 


metformin [From Glucophage] Allergy  Cough Verified 09/13/20 01:20


 


pantoprazole Allergy  Other Verified 09/13/20 01:20


 


pineapple Allergy  Blisters Verified 09/13/20 01:20


 


BEE VENOM Allergy  Swelling Uncoded 09/13/20 01:20











Home Medications: 


                                    Home Meds





Acetaminophen [Tylenol Extra Strength] 1,000 mg PO BID PRN 09/23/16 [History]


Aspirin [Halfprin] 81 mg PO DAILY 09/23/16 [History]


Calcium Citrate/Vitamin D3 [Calcium Citrate + D] 1 tab PO DAILY 09/23/16 

[History]


Insulin Aspart [Novolog Flexpen] 10 - 20 units SQ TIDMEALS 09/23/16 [History]


Insulin Detemir [Levemir Flextouch] 40 unit SQ BEDTIME 09/23/16 [History]


Multivitamin [Multivitamins] 1 cap PO DAILY 09/23/16 [History]


atorvaSTATin [Lipitor] 40 mg PO BEDTIME 09/23/16 [History]


Metoprolol Tartrate 12.5 mg PO BID 08/07/19 [History]


Omeprazole 20 mg PO DAILY 08/07/19 [History]


Alogliptin Benzoate [Alogliptin] 12.5 mg PO DAILY 08/21/20 [History]


Betamethasone Dipropionate [Betamethasone Diprop Augmented] 1 applic TOP BID PRN

08/21/20 [History]


Cholecalciferol (Vitamin D3) [Vitamin D3] 25 mcg PO DAILY 08/21/20 [History]


Fluticasone Propionate [Flonase] 1 spray NASBOTH BEDTIME 08/21/20 [History]


Losartan [Cozaar] 25 mg PO DAILY 08/21/20 [History]


Montelukast [Singulair] 10 mg PO BEDTIME 08/21/20 [History]


Albuterol [Proair HFA] 1 puff INH Q4H PRN 09/13/20 [History]


Cetirizine HCl 10 mg PO BEDTIME 09/13/20 [History]











Past Medical History


HEENT History: Reports: Cataract, Hard of Hearing, Impaired Vision


Other HEENT History: wears glasses


Cardiovascular History: Reports: CAD, High Cholesterol, Hypertension, MI


Respiratory History: Reports: None


Gastrointestinal History: Reports: GERD, Hiatal Hernia


Other Gastrointestinal History: SCHATZKI'S RING


Genitourinary History: Reports: Chronic Renal Insuffiency, Other (See Below)


Other Genitourinary History: RENAL STENT.  Renal artery stenosis


OB/GYN History: Reports: Pregnancy


Musculoskeletal History: Reports: Osteoarthritis


Other Musculoskeletal History: FRACTURED LEFT ANKLE X3


Neurological History: Reports: None


Psychiatric History: Reports: None


Endocrine/Metabolic History: Reports: Diabetes, Type II


Hematologic History: Reports: None


Immunologic History: Reports: None


Oncologic (Cancer) History: Reports: None


Dermatologic History: Reports: None





- Infectious Disease History


Infectious Disease History: Reports: Chicken Pox, Measles, Mumps





- Past Surgical History


Head Surgeries/Procedures: Reports: None


HEENT Surgical History: Reports: Cataract Surgery


Cardiovascular Surgical History: Reports: Coronary Artery Bypass, Coronary 

Artery Stent


Other Cardiovascular Surgeries/Procedures: ELECTROCARDIOGRAM


Respiratory Surgical History: Reports: None


GI Surgical History: Reports: EGD, Esophageal Dilatation


Female  Surgical History: Reports: None


Endocrine Surgical History: Reports: None


Neurological Surgical History: Reports: None


Musculoskeletal Surgical History: Reports: Other (See Below)


Other Musculoskeletal Surgeries/Procedures:: left knee surgery


Oncologic Surgical History: Reports: None


Dermatological Surgical History: Reports: None





Social & Family History





- Family History


Family Medical History: Noncontributory


Cardiac: Reports: High Cholesterol, Hypertension, MI


: Reports: Dialysis


Musculoskeletal: Reports: Arthritis


Endocrine/Metabolic: Reports: Diabetes, type II


Oncologic: Reports: Lung





- Tobacco Use


Smoking Status *Q: Never Smoker


Second Hand Smoke Exposure: No





- Caffeine Use


Caffeine Use: Reports: Coffee





- Recreational Drug Use


Recreational Drug Use: No





- Living Situation & Occupation


Living situation: Reports: with Family


Occupation: Retired





H&P Review of Systems





- Review of Systems:


Review Of Systems: See Below


General: Reports: No Symptoms


Pulmonary: Reports: No Symptoms


Cardiovascular: Reports: No Symptoms


Gastrointestinal: Reports: No Symptoms


Musculoskeletal: Reports: No Symptoms


Skin: Reports: No Symptoms





Exam





- Exam


Exam: See Below





- Vital Signs


Vital Signs: 


                                Last Vital Signs











Temp  36.4 C   09/13/20 08:00


 


Pulse  66   09/13/20 08:00


 


Resp  14   09/13/20 08:00


 


BP  177/75 H  09/13/20 08:00


 


Pulse Ox  99   09/13/20 08:00











Weight: 68.22 kg





- Exam


General: Alert, Oriented, Cooperative


Neck: Supple, Trachea Midline, 2


Lungs: Clear to Auscultation, Normal Respiratory Effort


Cardiovascular: Regular Rate, Regular Rhythm


GI/Abdominal Exam: Normal Bowel Sounds, Soft, Non-Tender, No Organomegaly, No 

Distention, No Abnormal Bruit, No Mass, Pelvis Stable





- Patient Data


Lab Results Last 24 hrs: 


                         Laboratory Results - last 24 hr











  09/12/20 09/12/20 09/12/20 Range/Units





  23:42 23:42 23:56 


 


WBC  6.8    (5.0-10.0)  10^3/uL


 


RBC  4.07 L    (4.2-5.4)  10^6/uL


 


Hgb  12.1  D    (12.0-16.0)  g/dL


 


Hct  34.1 L    (37.0-47.0)  %


 


MCV  83.8    ()  fL


 


MCH  29.7    (27.0-34.0)  pg


 


MCHC  35.5 H    (33.0-35.0)  g/dL


 


Plt Count  214    (150-450)  10^3/uL


 


Neut % (Auto)  60.2    (42.2-75.2)  %


 


Lymph % (Auto)  28.4    (20.5-50.1)  %


 


Mono % (Auto)  7.8    (2-8)  %


 


Eos % (Auto)  3.0    (1.0-3.0)  %


 


Baso % (Auto)  0.6    (0.0-1.0)  %


 


Sodium   125 L D   (136-145)  mmol/L


 


Potassium   3.7   (3.5-5.1)  mmol/L


 


Chloride   90 L   ()  mmol/L


 


Carbon Dioxide   28   (21-32)  mmol/L


 


Anion Gap   10.7   (7-13)  mEq/L


 


BUN   10   (7-18)  mg/dL


 


Creatinine   1.03 H   (0.55-1.02)  mg/dL


 


Est Cr Clr Drug Dosing   38.90   mL/min


 


Estimated GFR (MDRD)   53   


 


BUN/Creatinine Ratio   9.7   (No establ ref range)  


 


Glucose   111 H   (74-99)  mg/dL


 


POC Glucose     ()  mg/dl


 


Calcium   8.7   (8.5-10.1)  mg/dL


 


Total Bilirubin   0.6   (0.2-1.0)  mg/dL


 


AST   23   (15-37)  U/L


 


ALT   25   (14-59)  U/L


 


Alkaline Phosphatase   129 H   ()  U/L


 


Total Protein   7.5   (6.4-8.2)  g/dL


 


Albumin   3.3 L   (3.4-5.0)  g/dL


 


Globulin   4.2   


 


Albumin/Globulin Ratio   0.79   


 


Urine Color    Straw  (YELLOW)  


 


Urine Appearance    Slightly cloudy  (CLEAR)  


 


Urine pH    6.5  (5.0-9.0)  


 


Ur Specific Gravity    1.010  (1.005-1.030)  


 


Urine Protein    Trace H  (NEGATIVE)  


 


Urine Glucose (UA)    Negative  (NEGATIVE)  


 


Urine Ketones    Negative  (NEGATIVE)  


 


Urine Occult Blood    Trace-intact H  (NEGATIVE)  


 


Urine Nitrite    Negative  (NEGATIVE)  


 


Urine Bilirubin    Negative  (NEGATIVE)  


 


Urine Urobilinogen    0.2  (0.2-1.0)  mg/dL


 


Ur Leukocyte Esterase    Negative  (NEGATIVE)  


 


Urine RBC    0-5  /HPF


 


Urine WBC    0-5  (0-5/HPF)  /HPF


 


Ur Epithelial Cells    Few  (NOT SEEN)  /HPF


 


Amorphous Sediment    Few  (NOT SEEN)  /HPF


 


Urine Bacteria    Few  (0-FEW/HPF)  /HPF


 


Urine Mucus    Few H  (NOT SEEN)  /LPF














  09/13/20 09/13/20 09/13/20 Range/Units





  01:10 06:13 06:13 


 


WBC   5.4   (5.0-10.0)  10^3/uL


 


RBC   3.91 L   (4.2-5.4)  10^6/uL


 


Hgb   11.7 L   (12.0-16.0)  g/dL


 


Hct   32.8 L   (37.0-47.0)  %


 


MCV   83.9   ()  fL


 


MCH   29.9   (27.0-34.0)  pg


 


MCHC   35.7 H   (33.0-35.0)  g/dL


 


Plt Count   216   (150-450)  10^3/uL


 


Neut % (Auto)   58.3   (42.2-75.2)  %


 


Lymph % (Auto)   28.5   (20.5-50.1)  %


 


Mono % (Auto)   9.1 H   (2-8)  %


 


Eos % (Auto)   3.7 H   (1.0-3.0)  %


 


Baso % (Auto)   0.4   (0.0-1.0)  %


 


Sodium  127 L   130 L  (136-145)  mmol/L


 


Potassium    4.0  (3.5-5.1)  mmol/L


 


Chloride    95 L  ()  mmol/L


 


Carbon Dioxide    28  (21-32)  mmol/L


 


Anion Gap    11.0  (7-13)  mEq/L


 


BUN    9  (7-18)  mg/dL


 


Creatinine    0.94  (0.55-1.02)  mg/dL


 


Est Cr Clr Drug Dosing    42.62  mL/min


 


Estimated GFR (MDRD)    59  


 


BUN/Creatinine Ratio     (No establ ref range)  


 


Glucose    97  (74-99)  mg/dL


 


POC Glucose     ()  mg/dl


 


Calcium    8.5  (8.5-10.1)  mg/dL


 


Total Bilirubin     (0.2-1.0)  mg/dL


 


AST     (15-37)  U/L


 


ALT     (14-59)  U/L


 


Alkaline Phosphatase     ()  U/L


 


Total Protein     (6.4-8.2)  g/dL


 


Albumin     (3.4-5.0)  g/dL


 


Globulin     


 


Albumin/Globulin Ratio     


 


Urine Color     (YELLOW)  


 


Urine Appearance     (CLEAR)  


 


Urine pH     (5.0-9.0)  


 


Ur Specific Gravity     (1.005-1.030)  


 


Urine Protein     (NEGATIVE)  


 


Urine Glucose (UA)     (NEGATIVE)  


 


Urine Ketones     (NEGATIVE)  


 


Urine Occult Blood     (NEGATIVE)  


 


Urine Nitrite     (NEGATIVE)  


 


Urine Bilirubin     (NEGATIVE)  


 


Urine Urobilinogen     (0.2-1.0)  mg/dL


 


Ur Leukocyte Esterase     (NEGATIVE)  


 


Urine RBC     /HPF


 


Urine WBC     (0-5/HPF)  /HPF


 


Ur Epithelial Cells     (NOT SEEN)  /HPF


 


Amorphous Sediment     (NOT SEEN)  /HPF


 


Urine Bacteria     (0-FEW/HPF)  /HPF


 


Urine Mucus     (NOT SEEN)  /LPF














  09/13/20 Range/Units





  07:54 


 


WBC   (5.0-10.0)  10^3/uL


 


RBC   (4.2-5.4)  10^6/uL


 


Hgb   (12.0-16.0)  g/dL


 


Hct   (37.0-47.0)  %


 


MCV   ()  fL


 


MCH   (27.0-34.0)  pg


 


MCHC   (33.0-35.0)  g/dL


 


Plt Count   (150-450)  10^3/uL


 


Neut % (Auto)   (42.2-75.2)  %


 


Lymph % (Auto)   (20.5-50.1)  %


 


Mono % (Auto)   (2-8)  %


 


Eos % (Auto)   (1.0-3.0)  %


 


Baso % (Auto)   (0.0-1.0)  %


 


Sodium   (136-145)  mmol/L


 


Potassium   (3.5-5.1)  mmol/L


 


Chloride   ()  mmol/L


 


Carbon Dioxide   (21-32)  mmol/L


 


Anion Gap   (7-13)  mEq/L


 


BUN   (7-18)  mg/dL


 


Creatinine   (0.55-1.02)  mg/dL


 


Est Cr Clr Drug Dosing   mL/min


 


Estimated GFR (MDRD)   


 


BUN/Creatinine Ratio   (No establ ref range)  


 


Glucose   (74-99)  mg/dL


 


POC Glucose  115 H  ()  mg/dl


 


Calcium   (8.5-10.1)  mg/dL


 


Total Bilirubin   (0.2-1.0)  mg/dL


 


AST   (15-37)  U/L


 


ALT   (14-59)  U/L


 


Alkaline Phosphatase   ()  U/L


 


Total Protein   (6.4-8.2)  g/dL


 


Albumin   (3.4-5.0)  g/dL


 


Globulin   


 


Albumin/Globulin Ratio   


 


Urine Color   (YELLOW)  


 


Urine Appearance   (CLEAR)  


 


Urine pH   (5.0-9.0)  


 


Ur Specific Gravity   (1.005-1.030)  


 


Urine Protein   (NEGATIVE)  


 


Urine Glucose (UA)   (NEGATIVE)  


 


Urine Ketones   (NEGATIVE)  


 


Urine Occult Blood   (NEGATIVE)  


 


Urine Nitrite   (NEGATIVE)  


 


Urine Bilirubin   (NEGATIVE)  


 


Urine Urobilinogen   (0.2-1.0)  mg/dL


 


Ur Leukocyte Esterase   (NEGATIVE)  


 


Urine RBC   /HPF


 


Urine WBC   (0-5/HPF)  /HPF


 


Ur Epithelial Cells   (NOT SEEN)  /HPF


 


Amorphous Sediment   (NOT SEEN)  /HPF


 


Urine Bacteria   (0-FEW/HPF)  /HPF


 


Urine Mucus   (NOT SEEN)  /LPF











Result Diagrams: 


                                 09/13/20 06:13





                                 09/13/20 06:13


Problem List Initiated/Reviewed/Updated: Yes


Orders Last 24hrs: 


                               Active Orders 24 hr











 Category Date Time Status


 


 Admission Diagnosis [ADT] Stat ADT  09/13/20 00:42 Ordered


 


 Admission Status [Patient Status] [ADT] Routine ADT  09/13/20 00:42 Active


 


 Patient Status [ADT] Routine ADT  09/13/20 00:43 Active


 


 Cardiac Monitoring [RC] 08,20 Care  09/13/20 00:44 Active


 


 Intake and Output [RC] QSHIFT Care  09/13/20 00:44 Active


 


 Oxygen Therapy [RC] .PRN Care  09/13/20 00:43 Active


 


 RT Post Treatment Assessment [RC] Click to Edit Care  09/13/20 10:16 Active


 


 RT Pre-Treatment Assessment [RC] Click to Edit Care  09/13/20 10:16 Active


 


 Up ad Jessica [RC] ASDIRECTED Care  09/13/20 00:43 Active


 


 VTE/DVT Education [RC] PER UNIT ROUTINE Care  09/13/20 00:43 Active


 


 Vital Signs [RC] 00,04,08,12,16,20 Care  09/13/20 00:43 Active


 


 OT Evaluation and Treatment [CONS] Routine Cons  09/13/20 10:18 Active


 


 PT Evaluation and Treatment [CONS] Routine Cons  09/13/20 10:18 Active


 


 Consistent Carbohydrate Diet [DIET] Diet  09/13/20 Lunch Active


 


 SODIUM,NA [CHEM] Q6H Lab  09/13/20 12:00 Ordered


 


 SODIUM,NA [CHEM] Q6H Lab  09/13/20 18:00 Ordered


 


 SODIUM,NA [CHEM] Q6H Lab  09/14/20 00:00 Ordered


 


 Acetaminophen [TylenoL] Med  09/13/20 08:49 Active





 650 mg PO Q4H PRN   


 


 Acetaminophen [Tylenol Extra Strength] Med  09/13/20 10:15 Ordered





 1,000 mg PO BID PRN   


 


 Albuterol [Proventil HFA] Med  09/13/20 10:15 Ordered





 DOSE gm INH Q4H PRN   


 


 Alogliptin Benzoate [Alogliptin] Med  09/14/20 09:00 Ordered





 12.5 mg PO DAILY   


 


 Aspirin [Halfprin] Med  09/13/20 10:30 Active





 81 mg PO DAILY   


 


 Betamethasone Dipropionate [Betamethasone Diprop Med  09/13/20 10:15 Ordered





 Augmented]   





 1 applic TOP BID PRN   


 


 Calcium Citrate/Vitamin D3 [Calcium Citrate + D] Med  09/14/20 09:00 Ordered





 1 tab PO DAILY   


 


 Cetirizine HCl [Cetirizine HCl] Med  09/13/20 21:00 Active





 10 mg PO BEDTIME   


 


 Cholecalciferol (Vitamin D3) [Vitamin D3] Med  09/14/20 09:00 Ordered





 25 mcg PO DAILY   


 


 Fluticasone Propionate [Flonase] Med  09/13/20 21:00 Ordered





 DOSE gm NASBOTH BEDTIME   


 


 Insulin Detemir Med  09/13/20 21:00 Ordered





 40 unit SQ BEDTIME   


 


 Insulin Lispro [HumaLOG] Med  09/13/20 08:00 Active





 10 unit SUBCUT TIDMEALS   


 


 Insulin Lispro [HumaLOG] Med  09/13/20 09:00 Active





 See Protocol  SUBCUT QID   


 


 Losartan [Cozaar] Med  09/13/20 10:45 Active





 25 mg PO DAILY   


 


 Metoprolol Tartrate [Lopressor] Med  09/13/20 10:45 Active





 12.5 mg PO BID   


 


 Montelukast [Singulair] Med  09/13/20 21:00 Ordered





 10 mg PO BEDTIME   


 


 Multivitamin [Multivitamins] Med  09/14/20 09:00 Ordered





 1 cap PO DAILY   


 


 Omeprazole [Omeprazole] Med  09/13/20 10:45 Active





 20 mg PO ACBREAKFAST   


 


 Ondansetron [Zofran] Med  09/13/20 00:43 Active





 4 mg IVPUSH Q6H PRN   


 


 Sodium Chloride 0.9% [Normal Saline] 1,000 ml Med  09/13/20 00:45 Active





 IV ASDIRECTED   


 


 atorvaSTATin [Lipitor] Med  09/13/20 21:00 Ordered





 40 mg PO BEDTIME   


 


 Resuscitation Status Routine Resus Stat  09/13/20 00:43 Ordered








                                Medication Orders





Acetaminophen (Tylenol)  650 mg PO Q4H PRN


   PRN Reason: Pain (mild 1-3)


Acetaminophen (Tylenol Extra Strength)  1,000 mg PO BID PRN


   PRN Reason: Pain


Albuterol (Proventil Hfa)   gm INH Q4H PRN


   PRN Reason: Shortness of Breath


Aspirin (Halfprin)  81 mg PO DAILY ELVER


Atorvastatin Calcium (Lipitor)  40 mg PO BEDTIME ELVER


Cholecalciferol (Vitamin D3)  25 mcg PO DAILY EL[facility]


Fluticasone Propionate (Flonase)   gm NASBOTH BEDTIME EL[facility]


Sodium Chloride (Normal Saline)  1,000 mls @ 100 mls/hr IV ASDIRECTED EL[facility]


   Last Infusion: 09/13/20 10:00  Dose: 100 mls/hr


   Documented by: SANCHEZ


   Admin: 09/13/20 02:03  Dose: 125 mls/hr


   Documented by: MATTHIAS


Insulin Human Lispro (Humalog)  10 unit SUBCUT TIDMEALS [facility]


   Last Admin: 09/13/20 09:58  Dose: 10 units


   Documented by: MIGUEL ANGEL


Insulin Human Lispro (Humalog)  0 unit SUBCUT QID [facility]; Protocol


   Last Admin: 09/13/20 10:00 Dose:  Not Given


   Documented by: MIGUEL ANGEL


Losartan Potassium (Cozaar)  25 mg PO DAILY ELVER


Metoprolol Tartrate (Lopressor)  12.5 mg PO BID ELVER


Montelukast Sodium (Singulair)  10 mg PO BEDTIME ELVER


Non-Formulary Medication (Alogliptin Benzoate [Alogliptin])  12.5 mg PO DAILY 

ELVER


Non-Formulary Medication (Betamethasone Dipropionate [Betamethasone Diprop 

Augmented])  1 applic TOP BID PRN


   PRN Reason: Rash


Non-Formulary Medication (Calcium Citrate/Vitamin D3 [Calcium Citrate + D])  1 

tab PO DAILY ELVER


(Cetirizine Hcl [ Cetirizine Hcl] 10 Mg)*Pt Own Med*  10 mg PO BEDTIME ELVER


Non-Formulary Medication (Insulin Detemir)  40 unit SQ BEDTIME ELVER


Non-Formulary Medication (Multivitamin [Multivitamins])  1 cap PO DAILY ELVER


(Omeprazole [ Omeprazole] 20 Mg)* Pt Own Med*  20 mg PO ACBREAKFAST ELVER


Ondansetron HCl (Zofran)  4 mg IVPUSH Q6H PRN


   PRN Reason: Nausea/Vomiting








Assessment/Plan Comment:: 





The patient is a 70-year-old female with multiple medical problems who was 

admitted complaint of dizziness. Patient was found to have hyponatremia





#. Dizziness


Etiology is unclear


It may be due to dehydration.


Was was standing up and better when laying down





#. Hyponatremia


Serum sodium is at 125 


Likely from nausea and vomiting





#. Coronary artery disease


Status post coronary artery bypass graft





#. Diabetes mellitus type 2


On insulin therapy





#. Chronic kidney disease





Plan:


Admit patient to medical floor


Start intravenous fluid with normal saline going at 125 mL an hour


Obtain serum sodium every 6 hours 


 monitor blood sugar before meals and at bedtime


Check orthostatic vital signs


Physical therapy consult


Occupational therapy consult





Patient's medical chart reviewed. CODE STATUS is full code

## 2020-09-14 VITALS — HEART RATE: 61 BPM | SYSTOLIC BLOOD PRESSURE: 110 MMHG | DIASTOLIC BLOOD PRESSURE: 57 MMHG

## 2020-09-14 LAB
ANION GAP SERPL CALC-SCNC: 10.4 MEQ/L (ref 7–13)
CHLORIDE SERPL-SCNC: 104 MMOL/L (ref 98–107)
SODIUM SERPL-SCNC: 136 MMOL/L (ref 136–145)

## 2020-09-14 RX ADMIN — ASPIRIN SCH MG: 81 TABLET ORAL at 08:28

## 2020-09-14 RX ADMIN — VITAMIN D, TAB 1000IU (100/BT) SCH MCG: 25 TAB at 08:32

## 2020-09-14 RX ADMIN — Medication SCH MG: at 08:30

## 2020-09-14 RX ADMIN — THERA TABS SCH EACH: TAB at 08:38

## 2020-09-14 RX ADMIN — METOPROLOL TARTRATE SCH MG: 25 TABLET, FILM COATED ORAL at 08:33

## 2020-09-14 NOTE — PCM.DCSUM1
**Discharge Summary





- Hospital Course


Free Text/Narrative:: 








The patient is a 70-year-old female with multiple medical problems who was 

admitted complaint of dizziness. Patient was found to have hyponatremia





#. Dizziness probably from dehydration





#. Hyponatremia


Serum sodium was at 125 


Likely from nausea and vomiting





#. Coronary artery disease


Status post coronary artery bypass graft





#. Diabetes mellitus type 2


On insulin therapy





#. Chronic kidney disease





She was started on intravenous fluid. Her dizziness has subsided as well as 

nausea and vomiting. She has been able to ambulate. The patient will be 

discharged home and will have a repeat basic metabolic panel in 1 week.


Diagnosis: Stroke: No





- Discharge Data


Discharge Date: 09/14/20


Discharge Disposition: Home, Self-Care 01


Condition: Stable





- Referral to Home Health


Primary Care Physician: 


Joseph Urbina MD








- Patient Summary/Data


Consults: 


                                  Consultations





09/13/20 10:18


OT Evaluation and Treatment [CONS] Routine 


PT Evaluation and Treatment [CONS] Routine 














- Patient Instructions


Activity: As Tolerated


Other/Special Instructions: BMP in one week





- Discharge Plan


Home Medications: 


                                    Home Meds





Acetaminophen [Tylenol Extra Strength] 1,000 mg PO BID PRN 09/23/16 [History]


Aspirin [Halfprin] 81 mg PO DAILY 09/23/16 [History]


Calcium Citrate/Vitamin D3 [Calcium Citrate + D] 1 tab PO DAILY 09/23/16 

[History]


Insulin Aspart [Novolog Flexpen] 10 - 20 units SQ TIDMEALS 09/23/16 [History]


Insulin Detemir [Levemir Flextouch] 40 unit SQ BEDTIME 09/23/16 [History]


Multivitamin [Multivitamins] 1 cap PO DAILY 09/23/16 [History]


atorvaSTATin [Lipitor] 40 mg PO BEDTIME 09/23/16 [History]


Metoprolol Tartrate 12.5 mg PO BID 08/07/19 [History]


Omeprazole 20 mg PO DAILY 08/07/19 [History]


Alogliptin Benzoate [Alogliptin] 12.5 mg PO DAILY 08/21/20 [History]


Betamethasone Dipropionate [Betamethasone Diprop Augmented] 1 applic TOP BID PRN

08/21/20 [History]


Cholecalciferol (Vitamin D3) [Vitamin D3] 25 mcg PO DAILY 08/21/20 [History]


Fluticasone Propionate [Flonase] 1 spray NASBOTH BEDTIME 08/21/20 [History]


Losartan [Cozaar] 25 mg PO DAILY 08/21/20 [History]


Montelukast [Singulair] 10 mg PO BEDTIME 08/21/20 [History]


Albuterol [Proair HFA] 1 puff INH Q4H PRN 09/13/20 [History]


Cetirizine HCl 10 mg PO BEDTIME 09/13/20 [History]








Referrals: 


Joseph Urbina MD [Primary Care Provider] - 





- Discharge Summary/Plan Comment


DC Time >30 min.: No





- Review of Systems


General: Reports: No Symptoms


Pulmonary: Reports: No Symptoms


Cardiovascular: Reports: No Symptoms


Gastrointestinal: Reports: No Symptoms


Musculoskeletal: Reports: No Symptoms


Psychiatric: Reports: No Symptoms





- Patient Data


Vitals - Most Recent: 


                                Last Vital Signs











Temp  36.8 C   09/14/20 07:37


 


Pulse  61   09/14/20 08:33


 


Resp  18   09/14/20 07:37


 


BP  110/57 L  09/14/20 08:33


 


Pulse Ox  97   09/14/20 07:37








                                        





Orthostatic Blood Pressure [     131/72


Standing]                        


Orthostatic Blood Pressure [     140/77


Sitting]                         


Orthostatic Blood Pressure [     145/65


Supine]                          








Weight - Most Recent: 68.22 kg


I&O - Last 24 hours: 


                                 Intake & Output











 09/13/20 09/14/20 09/14/20





 22:59 06:59 14:59


 


Intake Total 600  1919


 


Output Total 900  


 


Balance -300  1919











Lab Results - Last 24 hrs: 


                         Laboratory Results - last 24 hr











  09/13/20 09/13/20 09/13/20 Range/Units





  11:36 12:25 16:45 


 


Sodium   131 L   (136-145)  mmol/L


 


Potassium     (3.5-5.1)  mmol/L


 


Chloride     ()  mmol/L


 


Carbon Dioxide     (21-32)  mmol/L


 


Anion Gap     (7-13)  mEq/L


 


BUN     (7-18)  mg/dL


 


Creatinine     (0.55-1.02)  mg/dL


 


Est Cr Clr Drug Dosing     mL/min


 


Estimated GFR (MDRD)     


 


Glucose     (74-99)  mg/dL


 


POC Glucose  221 H   80  ()  mg/dl


 


Calcium     (8.5-10.1)  mg/dL














  09/13/20 09/13/20 09/14/20 Range/Units





  17:54 20:40 00:15 


 


Sodium  133 L   132 L  (136-145)  mmol/L


 


Potassium     (3.5-5.1)  mmol/L


 


Chloride     ()  mmol/L


 


Carbon Dioxide     (21-32)  mmol/L


 


Anion Gap     (7-13)  mEq/L


 


BUN     (7-18)  mg/dL


 


Creatinine     (0.55-1.02)  mg/dL


 


Est Cr Clr Drug Dosing     mL/min


 


Estimated GFR (MDRD)     


 


Glucose     (74-99)  mg/dL


 


POC Glucose   156 H   ()  mg/dl


 


Calcium     (8.5-10.1)  mg/dL














  09/14/20 09/14/20 Range/Units





  05:55 08:00 


 


Sodium  136   (136-145)  mmol/L


 


Potassium  4.4   (3.5-5.1)  mmol/L


 


Chloride  104   ()  mmol/L


 


Carbon Dioxide  26   (21-32)  mmol/L


 


Anion Gap  10.4   (7-13)  mEq/L


 


BUN  10   (7-18)  mg/dL


 


Creatinine  1.06 H   (0.55-1.02)  mg/dL


 


Est Cr Clr Drug Dosing  37.80   mL/min


 


Estimated GFR (MDRD)  51   


 


Glucose  147 H   (74-99)  mg/dL


 


POC Glucose   139 H  ()  mg/dl


 


Calcium  8.2 L   (8.5-10.1)  mg/dL











Med Orders - Current: 


                               Current Medications





Acetaminophen (Tylenol Extra Strength)  1,000 mg PO BID PRN


   PRN Reason: Pain


   Last Admin: 09/13/20 21:09 Dose:  1,000 mg


   Documented by: 


Albuterol (Proventil Hfa)  0 gm INH Q4H PRN


   PRN Reason: Shortness of Breath


   Last Admin: 09/14/20 09:29 Dose:  1 puff


   Documented by: 


Aspirin (Halfprin)  81 mg PO DAILY [facility]


   Last Admin: 09/14/20 08:28 Dose:  81 mg


   Documented by: 


Calcium Carbonate (Calcium Carbonate/Vitamin D 1250 Mg-200 Unit)  1 tab PO DAILY

[facility]


   Last Admin: 09/14/20 08:37 Dose:  1 tab


   Documented by: 


Cholecalciferol (Vitamin D3)  25 mcg PO DAILY [facility]


   Last Admin: 09/14/20 08:32 Dose:  25 mcg


   Documented by: 


Fluticasone Propionate (Flonase)  0 gm NASBOTH BEDTIME [facility]


   Last Admin: 09/13/20 21:04 Dose:  1 spray


   Documented by: 


Sodium Chloride (Normal Saline)  1,000 mls @ 100 mls/hr IV ASDIRECTED [facility]


   Last Infusion: 09/14/20 09:24 Dose:  100 mls/hr


   Documented by: 


Insulin Glargine (Lantus)  40 unit SUBCUT BEDTIME [facility]


   Last Admin: 09/13/20 21:05 Dose:  40 units


   Documented by: 


Insulin Human Lispro (Humalog)  10 unit SUBCUT TIDMEALS [facility]


   Last Admin: 09/14/20 08:26 Dose:  10 units


   Documented by: 


Insulin Human Lispro (Humalog)  0 unit SUBCUT QID [facility]; Protocol


   Last Admin: 09/14/20 08:09 Dose:  Not Given


   Documented by: 


Losartan Potassium (Cozaar)  25 mg PO DAILY [facility]


   Last Admin: 09/14/20 08:32 Dose:  25 mg


   Documented by: 


Metoprolol Tartrate (Lopressor)  12.5 mg PO BID [facility]


   Last Admin: 09/14/20 08:33 Dose:  12.5 mg


   Documented by: 


Montelukast Sodium (Singulair)  10 mg PO BEDTIME [facility]


   Last Admin: 09/13/20 21:08 Dose:  10 mg


   Documented by: 


Multivitamins (Thera)  1 each PO DAILY [facility]


   Last Admin: 09/14/20 08:38 Dose:  1 each


   Documented by: 


(Alogliptin Benzoate [Alogliptin] 12.5 Mg)*Pt Own Med*  12.5 mg PO DAILY [facility]


   Last Admin: 09/14/20 08:30 Dose:  12.5 mg


   Documented by: 


Atorvastatin 40 Mg (Tab *Pt Own Med*)  0 each PO BEDTIME [facility]


   Last Admin: 09/13/20 21:08 Dose:  1 each


   Documented by: 


Non-Formulary Medication (Betamethasone Dipropionate [Betamethasone Diprop 

Augmented])  1 applic TOP BID PRN


   PRN Reason: Rash


(Cetirizine Hcl [ Cetirizine Hcl] 10 Mg)*Pt Own Med*  10 mg PO BEDTIME [facility]


   Last Admin: 09/13/20 21:03 Dose:  10 mg


   Documented by: 


(Omeprazole [ Omeprazole] 20 Mg)* Pt Own Med*  20 mg PO DAILY@0800 [facility]


   Last Admin: 09/14/20 08:35 Dose:  20 mg


   Documented by: 


Ondansetron HCl (Zofran)  4 mg IVPUSH Q6H PRN


   PRN Reason: Nausea/Vomiting





Discontinued Medications





Acetaminophen (Tylenol)  650 mg PO Q4H PRN


   PRN Reason: Pain (mild 1-3)


Meclizine HCl (Antivert)  12.5 mg PO ONETIME ONE


   Stop: 09/12/20 23:34


   Last Admin: 09/12/20 23:45 Dose:  12.5 mg


   Documented by: 


(Omeprazole [ Omeprazole] 20 Mg)* Pt Own Med*  20 mg PO ACBREAKFAST ELVER


   Last Admin: 09/13/20 11:02 Dose:  20 mg


   Documented by: 


Ondansetron HCl (Zofran)  4 mg IVPUSH ONETIME ONE


   Stop: 09/12/20 23:34


   Last Admin: 09/12/20 23:44 Dose:  4 mg


   Documented by: 











- Exam


General: Reports: Alert, Oriented, Cooperative


Neck: Reports: Supple


Lungs: Reports: Clear to Auscultation, Normal Respiratory Effort


Cardiovascular: Reports: Regular Rate, Regular Rhythm


GI/Abdominal Exam: Normal Bowel Sounds, Soft, Non-Tender, No Organomegaly, No 

Distention, No Abnormal Bruit, No Mass, Pelvis Stable


 (Female) Exam: Normal External Exam, Normal Speculum Exam, Normal Bimanual 

Exam

## 2020-11-30 ENCOUNTER — HOSPITAL ENCOUNTER (EMERGENCY)
Dept: HOSPITAL 43 - DL.ED | Age: 70
Discharge: HOME | End: 2020-11-30
Payer: MEDICARE

## 2020-11-30 VITALS — SYSTOLIC BLOOD PRESSURE: 152 MMHG | DIASTOLIC BLOOD PRESSURE: 74 MMHG | HEART RATE: 60 BPM

## 2020-11-30 DIAGNOSIS — E78.00: ICD-10-CM

## 2020-11-30 DIAGNOSIS — I12.9: ICD-10-CM

## 2020-11-30 DIAGNOSIS — Z91.018: ICD-10-CM

## 2020-11-30 DIAGNOSIS — E11.22: ICD-10-CM

## 2020-11-30 DIAGNOSIS — M19.90: ICD-10-CM

## 2020-11-30 DIAGNOSIS — Z20.828: ICD-10-CM

## 2020-11-30 DIAGNOSIS — K21.9: ICD-10-CM

## 2020-11-30 DIAGNOSIS — Z79.82: ICD-10-CM

## 2020-11-30 DIAGNOSIS — I25.10: ICD-10-CM

## 2020-11-30 DIAGNOSIS — Z88.8: ICD-10-CM

## 2020-11-30 DIAGNOSIS — N18.9: ICD-10-CM

## 2020-11-30 DIAGNOSIS — Z91.030: ICD-10-CM

## 2020-11-30 DIAGNOSIS — Z79.899: ICD-10-CM

## 2020-11-30 DIAGNOSIS — Z79.4: ICD-10-CM

## 2020-11-30 DIAGNOSIS — R07.9: Primary | ICD-10-CM

## 2020-11-30 DIAGNOSIS — I25.2: ICD-10-CM

## 2020-11-30 LAB
ANION GAP SERPL CALC-SCNC: 10.1 MEQ/L (ref 7–13)
CHLORIDE SERPL-SCNC: 99 MMOL/L (ref 98–107)
SODIUM SERPL-SCNC: 134 MMOL/L (ref 136–145)

## 2020-11-30 PROCEDURE — U0002 COVID-19 LAB TEST NON-CDC: HCPCS

## 2020-11-30 NOTE — CR
EXAMINATION: Chest 1V Frontal  SEX: Female   AGE: 70 years

 

CLINICAL HISTORY: 70-year-old hypertensive/diabetic female complaining of CHEST

PAIN.

 

Interpretation: (Comparison exam 31 December 2019) No acute new cardiopulmonary

abnormality.

 

1. Sternotomy wires and external cardiac monitor leads. Normal cardiac

silhouette (size and configuration).

2. No new pulmonary vascular congestion, cephalization of flow, alveolar edema

or pleural effusion.

3. No lung mass or hilar/mediastinal lymphadenopathy.

4. Despite less than optimal inspiratory effort no new focal lobar consolidation

i.e. no atelectasis/infiltrate.

5. No peripheral "groundglass" lung densities.

6. No pneumothorax or pneumomediastinum. No free subdiaphragmatic air.

## 2020-11-30 NOTE — EDM.PDOC
ED HPI GENERAL MEDICAL PROBLEM





- General


Chief Complaint: Chest Pain


Stated Complaint: HEART ATTACK


Time Seen by Provider: 11/30/20 13:55


Source of Information: Reports: Patient, Provider


History Limitations: Reports: No Limitations





- History of Present Illness


INITIAL COMMENTS - FREE TEXT/NARRATIVE: 





This 69 yo female patient reports to the emergency department due to chest pain 

that radiates to her left arm. The patient reports she has been having similar 

pains for the past 1 1/2 years (after she had her quadruple bypass). The patient

also reports she has been feeling a "burning" in her throat due to acid reflux. 

The patient reports she has an appointment in [place] for her throat. The 

patient reports she believes her symptoms have been worse over the past 4 days. 

The patient reports she has been taking her GERD medications as prescribed, but 

continues to have problems. 


Onset Date: 11/26/20


Duration: Constant


Location: Reports: Neck, Chest, Upper Extremity, Left


Quality: Reports: Ache, Dull


Severity: Moderate


Improves with: Reports: None


Worsens with: Reports: None


Context: Reports: Other


Associated Symptoms: Reports: No Other Symptoms





- Related Data


                                    Allergies











Allergy/AdvReac Type Severity Reaction Status Date / Time


 


chocolate flavor Allergy  Hives Verified 11/30/20 13:58


 


diazepam [From Valium] Allergy  Confusion Verified 11/30/20 13:58


 


diphenhydramine Allergy  Confusion Verified 11/30/20 13:58





[From Benadryl]     


 


kiwi Allergy  Blisters Verified 11/30/20 13:58


 


lisinopril [From Prinivil] Allergy  Swollen Verified 11/30/20 13:58





   Tongue  


 


metformin [From Glucophage] Allergy  Cough Verified 11/30/20 13:58


 


pantoprazole Allergy  Other Verified 11/30/20 13:58


 


pineapple Allergy  Blisters Verified 11/30/20 13:58


 


BEE VENOM Allergy  Swelling Uncoded 11/30/20 13:58











Home Meds: 


                                    Home Meds





Acetaminophen [Tylenol Extra Strength] 1,000 mg PO BID PRN 09/23/16 [History]


Aspirin [Halfprin] 81 mg PO DAILY 09/23/16 [History]


Calcium Citrate/Vitamin D3 [Calcium Citrate + D] 1 tab PO DAILY 09/23/16 

[History]


Insulin Aspart [Novolog Flexpen] 10 - 20 units SQ TIDMEALS 09/23/16 [History]


Insulin Detemir [Levemir Flextouch] 40 unit SQ BEDTIME 09/23/16 [History]


Multivitamin [Multivitamins] 1 cap PO DAILY 09/23/16 [History]


atorvaSTATin [Lipitor] 40 mg PO BEDTIME 09/23/16 [History]


Metoprolol Tartrate 12.5 mg PO BID 08/07/19 [History]


Omeprazole 20 mg PO DAILY 08/07/19 [History]


Alogliptin Benzoate [Alogliptin] 12.5 mg PO DAILY 08/21/20 [History]


Betamethasone Dipropionate [Betamethasone Diprop Augmented] 1 applic TOP BID PRN

08/21/20 [History]


Cholecalciferol (Vitamin D3) [Vitamin D3] 25 mcg PO DAILY 08/21/20 [History]


Fluticasone Propionate [Flonase] 1 spray NASBOTH BEDTIME 08/21/20 [History]


Losartan [Cozaar] 25 mg PO DAILY 08/21/20 [History]


Montelukast [Singulair] 10 mg PO BEDTIME 08/21/20 [History]


Albuterol [Proair HFA] 1 puff INH Q4H PRN 09/13/20 [History]


Cetirizine HCl 10 mg PO BEDTIME 09/13/20 [History]











Past Medical History


HEENT History: Reports: Cataract, Hard of Hearing, Impaired Vision


Other HEENT History: wears glasses


Cardiovascular History: Reports: CAD, High Cholesterol, Hypertension, MI


Respiratory History: Reports: None


Gastrointestinal History: Reports: GERD, Hiatal Hernia


Other Gastrointestinal History: SCHATZKI'S RING


Genitourinary History: Reports: Chronic Renal Insuffiency


Other Genitourinary History: RENAL STENT


OB/GYN History: Reports: Pregnancy


Musculoskeletal History: Reports: Osteoarthritis


Other Musculoskeletal History: FRACTURED LEFT ANKLE X3


Neurological History: Reports: None


Psychiatric History: Reports: None


Endocrine/Metabolic History: Reports: Diabetes, Type II


Hematologic History: Reports: None


Immunologic History: Reports: None


Oncologic (Cancer) History: Reports: None


Dermatologic History: Reports: None





- Infectious Disease History


Infectious Disease History: Reports: Chicken Pox, Measles, Mumps





- Past Surgical History


Head Surgeries/Procedures: Reports: None


HEENT Surgical History: Reports: Cataract Surgery


Cardiovascular Surgical History: Reports: Coronary Artery Bypass, Coronary 

Artery Stent


Other Cardiovascular Surgeries/Procedures: ELECTROCARDIOGRAM


Respiratory Surgical History: Reports: None


GI Surgical History: Reports: EGD, Esophageal Dilatation


Female  Surgical History: Reports: None


Endocrine Surgical History: Reports: None


Neurological Surgical History: Reports: None


Musculoskeletal Surgical History: Reports: Other (See Below)


Other Musculoskeletal Surgeries/Procedures:: left knee surgery


Oncologic Surgical History: Reports: None


Dermatological Surgical History: Reports: None





Social & Family History





- Family History


Family Medical History: No Pertinent Family History


Cardiac: Reports: High Cholesterol, Hypertension, MI


: Reports: Dialysis


Musculoskeletal: Reports: Arthritis


Endocrine/Metabolic: Reports: Diabetes, type II


Oncologic: Reports: Lung





- Tobacco Use


Tobacco Use Status *Q: Never Tobacco User


Second Hand Smoke Exposure: No





- Caffeine Use


Caffeine Use: Reports: Coffee





- Recreational Drug Use


Recreational Drug Use: No





- Living Situation & Occupation


Living situation: Reports: with Family


Occupation: Retired





ED ROS GENERAL





- Review of Systems


Review Of Systems: Comprehensive ROS is negative, except as noted in HPI.





ED EXAM, GENERAL





- Physical Exam


Exam: See Below


Exam Limited By: No Limitations


General Appearance: Alert, WD/WN, Anxious, Mild Distress


Eye Exam: Bilateral Eye: EOMI, Normal Inspection, PERRL


Ears: Normal External Exam, Normal Canal, Hearing Grossly Normal, Normal TMs


Nose: Normal Inspection, Normal Mucosa, No Blood


Throat/Mouth: Normal Inspection, Normal Lips, Normal Teeth, Normal Gums, Normal 

Oropharynx, Normal Voice, No Airway Compromise


Head: Atraumatic, Normocephalic


Neck: Normal Inspection, Supple, Non-Tender, Full Range of Motion


Respiratory/Chest: No Respiratory Distress, Lungs Clear, Normal Breath Sounds, 

No Accessory Muscle Use, Chest Non-Tender


Cardiovascular: Normal Peripheral Pulses, Regular Rate, Rhythm, No Edema, No 

Gallop, No JVD, No Murmur, No Rub


GI/Abdominal: Normal Bowel Sounds, Soft, Non-Tender, No Organomegaly, No 

Distention, No Abnormal Bruit, No Mass


 (Female) Exam: Deferred


Rectal (Female) Exam: Deferred


Back Exam: Normal Inspection, Full Range of Motion, NT


Extremities: Normal Inspection, Normal Range of Motion, Non-Tender, Normal 

Capillary Refill, No Pedal Edema


Neurological: Alert, Oriented, CN II-XII Intact, Normal Cognition, Normal Gait, 

Normal Reflexes, No Motor/Sensory Deficits


Psychiatric: Normal Affect, Normal Mood


Skin Exam: Warm, Dry, Intact, Normal Color, No Rash


Lymphatic: No Adenopathy





Course





- Vital Signs


Last Recorded V/S: 


                                Last Vital Signs











Temp  36.4 C   11/30/20 12:37


 


Pulse  60   11/30/20 12:37


 


Resp  18   11/30/20 12:37


 


BP  152/74 H  11/30/20 12:37


 


Pulse Ox  98   11/30/20 12:37














- Orders/Labs/Meds


Orders: 


                               Active Orders 24 hr











 Category Date Time Status


 


 EKG Documentation Completion [RC] STAT Care  11/30/20 12:36 Active


 


 CULTURE BLOOD [BC] Stat Lab  11/30/20 13:08 Received


 


 CULTURE STREP A CONFIRMATION [RM] Stat Lab  11/30/20 14:05 Results


 


 STREP SCRN A RAPID W CULT CONF [RM] Stat Lab  11/30/20 14:01 Ordered











Labs: 


                                Laboratory Tests











  11/30/20 11/30/20 11/30/20 Range/Units





  13:08 13:08 13:08 


 


WBC   6.1   (5.0-10.0)  10^3/uL


 


RBC   3.99 L   (4.2-5.4)  10^6/uL


 


Hgb   11.9 L   (12.0-16.0)  g/dL


 


Hct   34.6 L   (37.0-47.0)  %


 


MCV   86.7   ()  fL


 


MCH   29.8   (27.0-34.0)  pg


 


MCHC   34.4   (33.0-35.0)  g/dL


 


Plt Count   199   (150-450)  10^3/uL


 


Neut % (Auto)   67.4   (42.2-75.2)  %


 


Lymph % (Auto)   22.4   (20.5-50.1)  %


 


Mono % (Auto)   7.9   (2-8)  %


 


Eos % (Auto)   2.0   (1.0-3.0)  %


 


Baso % (Auto)   0.3   (0.0-1.0)  %


 


PT    9.8  (9.0-12.0)  SEC


 


INR    1.0  (0.9-1.2)  


 


Sodium  134 L    (136-145)  mmol/L


 


Potassium  4.1    (3.5-5.1)  mmol/L


 


Chloride  99    ()  mmol/L


 


Carbon Dioxide  29    (21-32)  mmol/L


 


Anion Gap  10.1    (7-13)  mEq/L


 


BUN  20 H    (7-18)  mg/dL


 


Creatinine  1.23 H    (0.55-1.02)  mg/dL


 


Est Cr Clr Drug Dosing  TNP    


 


Estimated GFR (MDRD)  43    


 


BUN/Creatinine Ratio  16.3    (No establ ref range)  


 


Glucose  92    (74-99)  mg/dL


 


Lactic Acid     (0.4-2.0)  mmol/L


 


Calcium  8.9    (8.5-10.1)  mg/dL


 


Total Bilirubin  0.5    (0.2-1.0)  mg/dL


 


AST  18    (15-37)  U/L


 


ALT  21    (14-59)  U/L


 


Alkaline Phosphatase  107    ()  U/L


 


Troponin I  < 0.017    (0.000-0.056)  ng/mL


 


Total Protein  7.1    (6.4-8.2)  g/dL


 


Albumin  3.4    (3.4-5.0)  g/dL


 


Globulin  3.7    


 


Albumin/Globulin Ratio  0.9    


 


SARS CoV-2 RNA Rapid LEAH     (NEGATIVE)  














  11/30/20 11/30/20 Range/Units





  13:08 14:05 


 


WBC    (5.0-10.0)  10^3/uL


 


RBC    (4.2-5.4)  10^6/uL


 


Hgb    (12.0-16.0)  g/dL


 


Hct    (37.0-47.0)  %


 


MCV    ()  fL


 


MCH    (27.0-34.0)  pg


 


MCHC    (33.0-35.0)  g/dL


 


Plt Count    (150-450)  10^3/uL


 


Neut % (Auto)    (42.2-75.2)  %


 


Lymph % (Auto)    (20.5-50.1)  %


 


Mono % (Auto)    (2-8)  %


 


Eos % (Auto)    (1.0-3.0)  %


 


Baso % (Auto)    (0.0-1.0)  %


 


PT    (9.0-12.0)  SEC


 


INR    (0.9-1.2)  


 


Sodium    (136-145)  mmol/L


 


Potassium    (3.5-5.1)  mmol/L


 


Chloride    ()  mmol/L


 


Carbon Dioxide    (21-32)  mmol/L


 


Anion Gap    (7-13)  mEq/L


 


BUN    (7-18)  mg/dL


 


Creatinine    (0.55-1.02)  mg/dL


 


Est Cr Clr Drug Dosing    


 


Estimated GFR (MDRD)    


 


BUN/Creatinine Ratio    (No establ ref range)  


 


Glucose    (74-99)  mg/dL


 


Lactic Acid  0.9   (0.4-2.0)  mmol/L


 


Calcium    (8.5-10.1)  mg/dL


 


Total Bilirubin    (0.2-1.0)  mg/dL


 


AST    (15-37)  U/L


 


ALT    (14-59)  U/L


 


Alkaline Phosphatase    ()  U/L


 


Troponin I    (0.000-0.056)  ng/mL


 


Total Protein    (6.4-8.2)  g/dL


 


Albumin    (3.4-5.0)  g/dL


 


Globulin    


 


Albumin/Globulin Ratio    


 


SARS CoV-2 RNA Rapid LEAH   Negative  (NEGATIVE)  














Departure





- Departure


Time of Disposition: 15:07


Disposition: Home, Self-Care 01


Condition: Fair


Clinical Impression: 


 Nonspecific chest pain





Instructions:  Nonspecific Chest Pain, Adult, Easy-to-Read


Forms:  ED Department Discharge


Care Plan Goals: 


The patient was advised of her examination, lab, EKG and x-ray results during 

the visit. The patient was encouraged to follow-up with her cardiologist as 

scheduled and with her primary care facility for continued evaluation and 

further management. If the patient has any additional symptoms or concerns, the 

patient should either return to the emergency department or visit her primary 

care facility. 





Sepsis Event Note (ED)





- Evaluation


Sepsis Screening Result: No Definite Risk





- Focused Exam


Vital Signs: 


                                   Vital Signs











  Temp Pulse Resp BP Pulse Ox


 


 11/30/20 12:37  36.4 C  60  18  152/74 H  98














- My Orders


Last 24 Hours: 


My Active Orders





11/30/20 12:36


EKG Documentation Completion [RC] STAT 





11/30/20 13:08


CULTURE BLOOD [BC] Stat 





11/30/20 14:01


STREP SCRN A RAPID W CULT CONF [RM] Stat 





11/30/20 14:05


CULTURE STREP A CONFIRMATION [RM] Stat 














- Assessment/Plan


Last 24 Hours: 


My Active Orders





11/30/20 12:36


EKG Documentation Completion [RC] STAT 





11/30/20 13:08


CULTURE BLOOD [BC] Stat 





11/30/20 14:01


STREP SCRN A RAPID W CULT CONF [RM] Stat 





11/30/20 14:05


CULTURE STREP A CONFIRMATION [RM] Stat

## 2021-04-07 ENCOUNTER — HOSPITAL ENCOUNTER (EMERGENCY)
Dept: HOSPITAL 43 - DL.ED | Age: 71
Discharge: HOME | End: 2021-04-07
Payer: MEDICARE

## 2021-04-07 VITALS — HEART RATE: 66 BPM | DIASTOLIC BLOOD PRESSURE: 72 MMHG | SYSTOLIC BLOOD PRESSURE: 209 MMHG

## 2021-04-07 DIAGNOSIS — Z88.8: ICD-10-CM

## 2021-04-07 DIAGNOSIS — K21.9: ICD-10-CM

## 2021-04-07 DIAGNOSIS — Z79.82: ICD-10-CM

## 2021-04-07 DIAGNOSIS — Z95.1: ICD-10-CM

## 2021-04-07 DIAGNOSIS — I12.9: ICD-10-CM

## 2021-04-07 DIAGNOSIS — I25.10: ICD-10-CM

## 2021-04-07 DIAGNOSIS — E78.00: ICD-10-CM

## 2021-04-07 DIAGNOSIS — Z79.4: ICD-10-CM

## 2021-04-07 DIAGNOSIS — N30.00: ICD-10-CM

## 2021-04-07 DIAGNOSIS — I25.2: ICD-10-CM

## 2021-04-07 DIAGNOSIS — Z91.018: ICD-10-CM

## 2021-04-07 DIAGNOSIS — R42: Primary | ICD-10-CM

## 2021-04-07 DIAGNOSIS — Z91.030: ICD-10-CM

## 2021-04-07 DIAGNOSIS — Z95.5: ICD-10-CM

## 2021-04-07 DIAGNOSIS — Z79.899: ICD-10-CM

## 2021-04-07 DIAGNOSIS — N18.9: ICD-10-CM

## 2021-04-07 DIAGNOSIS — E11.22: ICD-10-CM

## 2021-04-07 LAB
ANION GAP SERPL CALC-SCNC: 12 MEQ/L (ref 7–13)
CHLORIDE SERPL-SCNC: 94 MMOL/L (ref 98–107)
SODIUM SERPL-SCNC: 130 MMOL/L (ref 136–145)

## 2021-04-07 PROCEDURE — 87086 URINE CULTURE/COLONY COUNT: CPT

## 2021-04-07 PROCEDURE — 84484 ASSAY OF TROPONIN QUANT: CPT

## 2021-04-07 PROCEDURE — 80053 COMPREHEN METABOLIC PANEL: CPT

## 2021-04-07 PROCEDURE — 87088 URINE BACTERIA CULTURE: CPT

## 2021-04-07 PROCEDURE — 93005 ELECTROCARDIOGRAM TRACING: CPT

## 2021-04-07 PROCEDURE — 99284 EMERGENCY DEPT VISIT MOD MDM: CPT

## 2021-04-07 PROCEDURE — 36415 COLL VENOUS BLD VENIPUNCTURE: CPT

## 2021-04-07 PROCEDURE — 82962 GLUCOSE BLOOD TEST: CPT

## 2021-04-07 PROCEDURE — 71045 X-RAY EXAM CHEST 1 VIEW: CPT

## 2021-04-07 PROCEDURE — 87186 SC STD MICRODIL/AGAR DIL: CPT

## 2021-04-07 PROCEDURE — 85025 COMPLETE CBC W/AUTO DIFF WBC: CPT

## 2021-04-07 PROCEDURE — 81001 URINALYSIS AUTO W/SCOPE: CPT

## 2021-04-07 PROCEDURE — 93010 ELECTROCARDIOGRAM REPORT: CPT

## 2021-04-07 NOTE — CR
PROCEDURE INFORMATION: 

Exam: XR Chest 

Exam date and time: 4/7/2021 1:54 AM 

Age: 70 years old 

Clinical indication: Other: Chest pain 



TECHNIQUE: 

Imaging protocol: XR of the chest 

Views: 1 view. 



COMPARISON: 

CR Chest 1V Frontal 11/30/2020 12:57 PM 



FINDINGS: 

Lungs: Unremarkable. No consolidation. 

Pleural spaces: Unremarkable. No pleural effusion. No pneumothorax. 

Heart/Mediastinum: Unremarkable. No cardiomegaly. 

Bones/joints: There has been a median sternotomy. 



IMPRESSION: 

No acute disease.

## 2021-04-07 NOTE — EDM.PDOC
ED HPI GENERAL MEDICAL PROBLEM





- General


Time Seen by Provider: 04/07/21 00:56


Source of Information: Reports: Patient, EMS, EMS Notes Reviewed, RN, RN Notes 

Reviewed


History Limitations: Reports: No Limitations





- History of Present Illness


INITIAL COMMENTS - FREE TEXT/NARRATIVE: 


Patient is a 70-year-old female who presents to ER per [facility] 

service with complaint of dizziness this evening.  Patient states she has been 

doctoring at the [facility].  States she was told to hold her 

Levemir in the evenings, as she states she wakes up in the middle of the night 

seeing spots.  Her primary care provider instructed her to hold her Levemir.  

Patient states this evening she was getting ready for bed and she did become 

dizzy a few different times.  States the first time she was looking up and 

putting something away on a shelf, again when she got up from her bed.  Patient 

states she feels as though her head is spinning.  History of hypertension, 

diabetes.  Patient states she has a history of hyponatremia, and was concerned 

that that was what was causing her dizziness.  Patient states she has had some 

hypoglycemic episodes as well, last being today.  She states her blood sugar was

in the 60s, she drank a can of Coke which brought her blood sugar up.  Patient 

denies any recent illness or injury, denies nausea, vomiting, diarrhea, fever, 

chills, shortness of breath.  Patient states from time to time she does have a 

pulling sensation in the left anterior chest.  She states this has been present 

since her bypass surgery in June 2019.


Onset: Today





- Related Data


                                    Allergies











Allergy/AdvReac Type Severity Reaction Status Date / Time


 


chocolate flavor Allergy  Hives Verified 04/07/21 01:29


 


diazepam [From Valium] Allergy  Confusion Verified 04/07/21 01:29


 


diphenhydramine Allergy  Confusion Verified 04/07/21 01:29





[From Benadryl]     


 


kiwi Allergy  Blisters Verified 04/07/21 01:29


 


lisinopril [From Prinivil] Allergy  Swollen Verified 04/07/21 01:29





   Tongue  


 


metformin [From Glucophage] Allergy  Cough Verified 04/07/21 01:29


 


pantoprazole Allergy  Other Verified 04/07/21 01:29


 


pineapple Allergy  Blisters Verified 04/07/21 01:29


 


BEE VENOM Allergy  Swelling Uncoded 04/07/21 01:29











Home Meds: 


                                    Home Meds





Acetaminophen [Tylenol Extra Strength] 1,000 mg PO BID PRN 09/23/16 [History]


Aspirin [Halfprin] 81 mg PO DAILY 09/23/16 [History]


Calcium Citrate/Vitamin D3 [Calcium Citrate + D] 1 tab PO DAILY 09/23/16 

[History]


Insulin Aspart [Novolog Flexpen] 10 - 20 units SQ TIDMEALS 09/23/16 [History]


Insulin Detemir [Levemir Flextouch] 46 unit SQ BEDTIME 09/23/16 [History]


Multivitamin [Multivitamins] 1 cap PO DAILY 09/23/16 [History]


atorvaSTATin [Lipitor] 40 mg PO BEDTIME 09/23/16 [History]


Metoprolol Tartrate 25 mg PO BID 08/07/19 [History]


Omeprazole 20 mg PO DAILY 08/07/19 [History]


Alogliptin Benzoate [Alogliptin] 12.5 mg PO DAILY 08/21/20 [History]


Betamethasone Dipropionate [Betamethasone Diprop Augmented] 1 applic TOP BID PRN

08/21/20 [History]


Cholecalciferol (Vitamin D3) [Vitamin D3] 25 mcg PO DAILY 08/21/20 [History]


Fluticasone Propionate [Flonase] 1 spray NASBOTH BEDTIME 08/21/20 [History]


Losartan [Cozaar] 50 mg PO DAILY 08/21/20 [History]


Montelukast [Singulair] 10 mg PO BEDTIME 08/21/20 [History]


Albuterol [Proair HFA] 1 puff INH Q4H PRN 09/13/20 [History]


Cetirizine HCl 10 mg PO BEDTIME 09/13/20 [History]


Magnesium Oxide [Magnesium] 400 mg PO DAILY 04/07/21 [History]











Past Medical History


HEENT History: Reports: Cataract, Hard of Hearing, Impaired Vision


Other HEENT History: wears glasses


Cardiovascular History: Reports: CAD, High Cholesterol, Hypertension, MI


Respiratory History: Reports: None


Gastrointestinal History: Reports: GERD, Hiatal Hernia


Other Gastrointestinal History: SCHATZKI'S RING


Genitourinary History: Reports: Chronic Renal Insuffiency, Other (See Below)


Other Genitourinary History: RENAL STENT.  Renal artery stenosis


OB/GYN History: Reports: Pregnancy


Musculoskeletal History: Reports: Osteoarthritis


Other Musculoskeletal History: FRACTURED LEFT ANKLE X3


Neurological History: Reports: None


Psychiatric History: Reports: None


Endocrine/Metabolic History: Reports: Diabetes, Type II


Hematologic History: Reports: None


Immunologic History: Reports: None


Oncologic (Cancer) History: Reports: None


Dermatologic History: Reports: None





- Infectious Disease History


Infectious Disease History: Reports: Chicken Pox, Measles, Mumps





- Past Surgical History


Head Surgeries/Procedures: Reports: None


HEENT Surgical History: Reports: Cataract Surgery


Cardiovascular Surgical History: Reports: Coronary Artery Bypass, Coronary 

Artery Stent


Other Cardiovascular Surgeries/Procedures: ELECTROCARDIOGRAM


Respiratory Surgical History: Reports: None


GI Surgical History: Reports: EGD, Esophageal Dilatation


Female  Surgical History: Reports: None


Endocrine Surgical History: Reports: None


Neurological Surgical History: Reports: None


Musculoskeletal Surgical History: Reports: Other (See Below)


Other Musculoskeletal Surgeries/Procedures:: left knee surgery


Oncologic Surgical History: Reports: None


Dermatological Surgical History: Reports: None





Social & Family History





- Family History


Family Medical History: No Pertinent Family History


Cardiac: Reports: High Cholesterol, Hypertension, MI


: Reports: Dialysis


Musculoskeletal: Reports: Arthritis


Endocrine/Metabolic: Reports: Diabetes, type II


Oncologic: Reports: Lung





- Caffeine Use


Caffeine Use: Reports: Coffee





- Living Situation & Occupation


Living situation: Reports: with Family


Occupation: Retired





ED ROS GENERAL





- Review of Systems


Review Of Systems: Comprehensive ROS is negative, except as noted in HPI.





ED EXAM, DIZZINESS





- Physical Exam


Exam: See Below


Exam Limited By: No Limitations


General Appearance: Alert, WD/WN, No Apparent Distress


Eye Exam: Bilateral Eye: EOMI, Normal Inspection


Nystagmus: short duration (Patient sat up in bed, ambulated to the bathroom 

without dizziness)


Ears: Normal External Exam, Hearing Grossly Normal


Nose: Normal Inspection, Normal Mucosa, No Blood


Throat/Mouth: Normal Inspection, Normal Lips, Normal Teeth, Normal Gums, Normal 

Oropharynx, Normal Voice, No Airway Compromise


Head Exam: Atraumatic, Normocephalic


Neck: Normal Inspection, Supple, Non-Tender, Full Range of Motion


Respiratory/Chest: No Respiratory Distress, Lungs Clear, Normal Breath Sounds, 

No Accessory Muscle Use, Chest Non-Tender


Cardiovascular: Normal Peripheral Pulses, Regular Rate, Rhythm, No Edema, No 

Gallop, No JVD, No Murmur, No Rub


GI/Abdominal: Normal Bowel Sounds, Soft, Non-Tender, No Organomegaly, No 

Distention, No Abnormal Bruit, No Mass


 (Female) Exam: Deferred


Rectal (Female) Exam: Deferred


Neurological: Alert, Normal Mood/Affect, Normal Dorsiflexion, CN II-XII Intact, 

Normal Plantar Flexion, Normal Gait, Normal Reflexes, No Motor/Sensory Deficits,

 Oriented x 3


Back Exam: Normal Inspection, Full Range of Motion, NT


Extremities: Normal Inspection, Normal Range of Motion, Non-Tender, No Pedal 

Edema, Normal Capillary Refill


Psychiatric: Normal Affect, Normal Mood


Skin Exam: Warm, Dry, Intact, Normal Color, No Rash


  ** #1 Interpretation


EKG Date: 04/07/21


Time: 00:51


Rhythm: NSR


Rate (Beats/Min): 62


Axis: Normal


P-Wave: Present


QRS: Normal


ST-T: Normal


QT: Normal


Comparison: No Change





Course





- Vital Signs


Last Recorded V/S: 


                                Last Vital Signs











Temp  97.2 F   04/07/21 01:03


 


Pulse  66   04/07/21 01:03


 


Resp  14   04/07/21 01:03


 


BP  209/72 H  04/07/21 01:03


 


Pulse Ox  98   04/07/21 01:03














- Orders/Labs/Meds


Orders: 


                               Active Orders 24 hr











 Category Date Time Status


 


 Blood Glucose Check, Bedside [RC] ONETIME Care  04/07/21 01:07 Active


 


 EKG Documentation Completion [RC] STAT Care  04/07/21 00:55 Active


 


 CULTURE URINE [] Stat Lab  04/07/21 01:25 Received


 


 Sodium Chloride 0.9% [Normal Saline] 1,000 ml Med  04/07/21 01:34 Active





 IV .BOLUS   








                                Medication Orders





Sodium Chloride (Normal Saline)  1,000 mls @ 999 mls/hr IV .BOLUS ONE


   Stop: 04/07/21 02:34


   Last Admin: 04/07/21 01:41  Dose: 999 mls/hr


   Documented by: [id number]








Labs: 


                                Laboratory Tests











  04/07/21 04/07/21 04/07/21 Range/Units





  01:04 01:04 01:09 


 


WBC  6.7    (5.0-10.0)  10^3/uL


 


RBC  3.98 L    (4.2-5.4)  10^6/uL


 


Hgb  11.6 L    (12.0-16.0)  g/dL


 


Hct  34.0 L    (37.0-47.0)  %


 


MCV  85.4    ()  fL


 


MCH  29.1    (27.0-34.0)  pg


 


MCHC  34.1    (33.0-35.0)  g/dL


 


Plt Count  246    (150-450)  10^3/uL


 


Neut % (Auto)  63.5    (42.2-75.2)  %


 


Lymph % (Auto)  25.0    (20.5-50.1)  %


 


Mono % (Auto)  8.2 H    (2-8)  %


 


Eos % (Auto)  3.0    (1.0-3.0)  %


 


Baso % (Auto)  0.3    (0.0-1.0)  %


 


Sodium   130 L   (136-145)  mmol/L


 


Potassium   4.0   (3.5-5.1)  mmol/L


 


Chloride   94 L   ()  mmol/L


 


Carbon Dioxide   28   (21-32)  mmol/L


 


Anion Gap   12.0   (7-13)  mEq/L


 


BUN   15   (7-18)  mg/dL


 


Creatinine   1.07 H   (0.55-1.02)  mg/dL


 


Est Cr Clr Drug Dosing   TNP   


 


Estimated GFR (MDRD)   51   


 


BUN/Creatinine Ratio   14.0   (No establ ref range)  


 


Glucose   181 H   (70-99)  mg/dL


 


POC Glucose    192 H  ()  mg/dl


 


Calcium   8.7   (8.5-10.1)  mg/dL


 


Total Bilirubin   0.3   (0.2-1.0)  mg/dL


 


AST   20   (15-37)  U/L


 


ALT   23   (14-59)  U/L


 


Alkaline Phosphatase   107   ()  U/L


 


Troponin I   < 0.017   (0.000-0.056)  ng/mL


 


Total Protein   7.1   (6.4-8.2)  g/dL


 


Albumin   3.2 L   (3.4-5.0)  g/dL


 


Globulin   3.9   


 


Albumin/Globulin Ratio   0.82   


 


Urine Color     (YELLOW)  


 


Urine Appearance     (CLEAR)  


 


Urine pH     (5.0-9.0)  


 


Ur Specific Gravity     (1.005-1.030)  


 


Urine Protein     (NEGATIVE)  


 


Urine Glucose (UA)     (NEGATIVE)  


 


Urine Ketones     (NEGATIVE)  


 


Urine Occult Blood     (NEGATIVE)  


 


Urine Nitrite     (NEGATIVE)  


 


Urine Bilirubin     (NEGATIVE)  


 


Urine Urobilinogen     (0.2-1.0)  mg/dL


 


Ur Leukocyte Esterase     (NEGATIVE)  


 


Urine RBC     /HPF


 


Urine WBC     (0-5/HPF)  /HPF


 


Ur Epithelial Cells     (NOT SEEN)  /HPF


 


Urine Bacteria     (0-FEW/HPF)  /HPF


 


Urine Mucus     (NOT SEEN)  /LPF














  04/07/21 Range/Units





  01:25 


 


WBC   (5.0-10.0)  10^3/uL


 


RBC   (4.2-5.4)  10^6/uL


 


Hgb   (12.0-16.0)  g/dL


 


Hct   (37.0-47.0)  %


 


MCV   ()  fL


 


MCH   (27.0-34.0)  pg


 


MCHC   (33.0-35.0)  g/dL


 


Plt Count   (150-450)  10^3/uL


 


Neut % (Auto)   (42.2-75.2)  %


 


Lymph % (Auto)   (20.5-50.1)  %


 


Mono % (Auto)   (2-8)  %


 


Eos % (Auto)   (1.0-3.0)  %


 


Baso % (Auto)   (0.0-1.0)  %


 


Sodium   (136-145)  mmol/L


 


Potassium   (3.5-5.1)  mmol/L


 


Chloride   ()  mmol/L


 


Carbon Dioxide   (21-32)  mmol/L


 


Anion Gap   (7-13)  mEq/L


 


BUN   (7-18)  mg/dL


 


Creatinine   (0.55-1.02)  mg/dL


 


Est Cr Clr Drug Dosing   


 


Estimated GFR (MDRD)   


 


BUN/Creatinine Ratio   (No establ ref range)  


 


Glucose   (70-99)  mg/dL


 


POC Glucose   ()  mg/dl


 


Calcium   (8.5-10.1)  mg/dL


 


Total Bilirubin   (0.2-1.0)  mg/dL


 


AST   (15-37)  U/L


 


ALT   (14-59)  U/L


 


Alkaline Phosphatase   ()  U/L


 


Troponin I   (0.000-0.056)  ng/mL


 


Total Protein   (6.4-8.2)  g/dL


 


Albumin   (3.4-5.0)  g/dL


 


Globulin   


 


Albumin/Globulin Ratio   


 


Urine Color  Light yellow  (YELLOW)  


 


Urine Appearance  Slightly cloudy  (CLEAR)  


 


Urine pH  5.5  (5.0-9.0)  


 


Ur Specific Gravity  1.010  (1.005-1.030)  


 


Urine Protein  30 H  (NEGATIVE)  


 


Urine Glucose (UA)  Negative  (NEGATIVE)  


 


Urine Ketones  Negative  (NEGATIVE)  


 


Urine Occult Blood  Negative  (NEGATIVE)  


 


Urine Nitrite  Negative  (NEGATIVE)  


 


Urine Bilirubin  Negative  (NEGATIVE)  


 


Urine Urobilinogen  0.2  (0.2-1.0)  mg/dL


 


Ur Leukocyte Esterase  Trace H  (NEGATIVE)  


 


Urine RBC  0-5  /HPF


 


Urine WBC  10-20 H  (0-5/HPF)  /HPF


 


Ur Epithelial Cells  Occasional  (NOT SEEN)  /HPF


 


Urine Bacteria  Few  (0-FEW/HPF)  /HPF


 


Urine Mucus  Rare  (NOT SEEN)  /LPF











Meds: 


Medications











Generic Name Dose Route Start Last Admin





  Trade Name Freq  PRN Reason Stop Dose Admin


 


Sodium Chloride  1,000 mls @ 999 mls/hr  04/07/21 01:34  04/07/21 01:41





  Normal Saline  IV  04/07/21 02:34  999 mls/hr





  .BOLUS ONE   Administration














Discontinued Medications














Generic Name Dose Route Start Last Admin





  Trade Name Freq  PRN Reason Stop Dose Admin


 


Nitrofurantoin Macrocrystals  100 mg  04/07/21 02:28 





  Nitrofurantoin Monohydrate/Macrocrystalline 100 Mg Cap  PO  04/07/21 02:29 





  ONETIME ONE  














- Radiology Interpretation


Free Text/Narrative:: 


chest xray:


PROCEDURE INFORMATION:


Exam: XR Chest


Exam date and time: 4/7/2021 1:54 AM


Age: 70 years old


Clinical indication: Other: Chest pain


TECHNIQUE:


Imaging protocol: XR of the chest


Views: 1 view.


COMPARISON:


CR Chest 1V Frontal 11/30/2020 12:57 PM


FINDINGS:


Lungs: Unremarkable. No consolidation.


Pleural spaces: Unremarkable. No pleural effusion. No pneumothorax.


Heart/Mediastinum: Unremarkable. No cardiomegaly.


Bones/joints: There has been a median sternotomy.


IMPRESSION:


No acute disease.


Thank you for allowing us to participate in the care of your patient.


Dictated and Authenticated by: Dung Pantoja DO


04/07/2021 2:22 AM Central Time ([place] & [place])








See rad report








Departure





- Departure


Time of Disposition: 02:34


Disposition: Home, Self-Care 01


Condition: Good


Clinical Impression: 


 Dizziness





UTI (urinary tract infection)


Qualifiers:


 Urinary tract infection type: acute cystitis Hematuria presence: without 

hematuria Qualified Code(s): N30.00 - Acute cystitis without hematuria





Hypertension


Qualifiers:


 Hypertension type: essential hypertension Qualified Code(s): I10 - Essential 

(primary) hypertension








- Discharge Information


*PRESCRIPTION DRUG MONITORING PROGRAM REVIEWED*: No


*COPY OF PRESCRIPTION DRUG MONITORING REPORT IN PATIENT SHAKIR: No


Instructions:  Urinary Tract Infection, Adult, Easy-to-Read, Dizziness, 

Easy-to-Read, Hypertension, Adult, Easy-to-Read


Forms:  ED Department Discharge


Additional Instructions: 


Drink plenty of water


RX: Macrobid 100mg orally twice daily for 3 days


Follow up with your primary care facility


Return to the ER with any worsening of symptoms








Sepsis Event Note (ED)





- Focused Exam


Vital Signs: 


                                   Vital Signs











  Temp Pulse Resp BP Pulse Ox


 


 04/07/21 01:03  97.2 F  66  14  209/72 H  98














- My Orders


Last 24 Hours: 


My Active Orders





04/07/21 00:55


EKG Documentation Completion [RC] STAT 





04/07/21 01:07


Blood Glucose Check, Bedside [RC] ONETIME 





04/07/21 01:25


CULTURE URINE [RM] Stat 





04/07/21 01:34


Sodium Chloride 0.9% [Normal Saline] 1,000 ml IV .BOLUS 














- Assessment/Plan


Last 24 Hours: 


My Active Orders





04/07/21 00:55


EKG Documentation Completion [RC] STAT 





04/07/21 01:07


Blood Glucose Check, Bedside [RC] ONETIME 





04/07/21 01:25


CULTURE URINE [RM] Stat 





04/07/21 01:34


Sodium Chloride 0.9% [Normal Saline] 1,000 ml IV .BOLUS

## 2021-08-23 ENCOUNTER — HOSPITAL ENCOUNTER (EMERGENCY)
Dept: HOSPITAL 43 - DL.ED | Age: 71
Discharge: HOME | End: 2021-08-23
Payer: COMMERCIAL

## 2021-08-23 VITALS — SYSTOLIC BLOOD PRESSURE: 118 MMHG | HEART RATE: 81 BPM | DIASTOLIC BLOOD PRESSURE: 70 MMHG

## 2021-08-23 DIAGNOSIS — Z20.822: ICD-10-CM

## 2021-08-23 DIAGNOSIS — Z88.8: ICD-10-CM

## 2021-08-23 DIAGNOSIS — Z91.018: ICD-10-CM

## 2021-08-23 DIAGNOSIS — N30.00: ICD-10-CM

## 2021-08-23 DIAGNOSIS — Z91.030: ICD-10-CM

## 2021-08-23 DIAGNOSIS — Z95.1: ICD-10-CM

## 2021-08-23 DIAGNOSIS — J06.9: Primary | ICD-10-CM

## 2021-08-23 DIAGNOSIS — Z88.5: ICD-10-CM

## 2021-08-23 DIAGNOSIS — I12.9: ICD-10-CM

## 2021-08-23 DIAGNOSIS — I25.10: ICD-10-CM

## 2021-08-23 DIAGNOSIS — E87.1: ICD-10-CM

## 2021-08-23 DIAGNOSIS — I25.2: ICD-10-CM

## 2021-08-23 DIAGNOSIS — E78.00: ICD-10-CM

## 2021-08-23 DIAGNOSIS — K21.9: ICD-10-CM

## 2021-08-23 DIAGNOSIS — E11.22: ICD-10-CM

## 2021-08-23 DIAGNOSIS — N18.9: ICD-10-CM

## 2021-08-23 LAB
ANION GAP SERPL CALC-SCNC: 11.8 MEQ/L (ref 7–13)
CHLORIDE SERPL-SCNC: 87 MMOL/L (ref 98–107)
SODIUM SERPL-SCNC: 123 MMOL/L (ref 136–145)

## 2021-08-23 PROCEDURE — 87186 SC STD MICRODIL/AGAR DIL: CPT

## 2021-08-23 PROCEDURE — 83880 ASSAY OF NATRIURETIC PEPTIDE: CPT

## 2021-08-23 PROCEDURE — 36415 COLL VENOUS BLD VENIPUNCTURE: CPT

## 2021-08-23 PROCEDURE — 84484 ASSAY OF TROPONIN QUANT: CPT

## 2021-08-23 PROCEDURE — 87088 URINE BACTERIA CULTURE: CPT

## 2021-08-23 PROCEDURE — 87635 SARS-COV-2 COVID-19 AMP PRB: CPT

## 2021-08-23 PROCEDURE — 86140 C-REACTIVE PROTEIN: CPT

## 2021-08-23 PROCEDURE — 99284 EMERGENCY DEPT VISIT MOD MDM: CPT

## 2021-08-23 PROCEDURE — 85025 COMPLETE CBC W/AUTO DIFF WBC: CPT

## 2021-08-23 PROCEDURE — 71045 X-RAY EXAM CHEST 1 VIEW: CPT

## 2021-08-23 PROCEDURE — 87086 URINE CULTURE/COLONY COUNT: CPT

## 2021-08-23 PROCEDURE — U0002 COVID-19 LAB TEST NON-CDC: HCPCS

## 2021-08-23 PROCEDURE — 81001 URINALYSIS AUTO W/SCOPE: CPT

## 2021-08-23 PROCEDURE — 80053 COMPREHEN METABOLIC PANEL: CPT

## 2021-08-23 PROCEDURE — 83605 ASSAY OF LACTIC ACID: CPT

## 2021-08-23 NOTE — EDM.PDOC
ED HPI GENERAL MEDICAL PROBLEM





- General


Chief Complaint: General


Stated Complaint: IN BY AMBULANCE


Time Seen by Provider: 21 10:30


Source of Information: Reports: Patient, EMS, Old Records, RN, RN Notes Reviewed


History Limitations: Reports: No Limitations





- History of Present Illness


INITIAL COMMENTS - FREE TEXT/NARRATIVE: 


Alberta is a 72 y/o female who presents to the ED via personal vehicle with 

complaints of productive cough, congestion, decreased appetite, and general 

malaise.  The patient reports her symptoms began two weeks ago and have 

progressively worsened in that time.  She notes she was examined in clinic one 

week ago and given an antihistamine which provided minimal alleviation of 

symptoms.  The patient denies fever, shaking chills, chest pain, palpitations, 

dyspepsia, nausea, or vomiting.  She does attest to sore throat and lower 

abdominal pain while coughing.  She has taken OTC cough syrup which has provided

no alleviation in symptoms.  The patient denies tobacco, alcohol, or 

recreational drug use. 





  ** right hip


Pain Score (Numeric/FACES): 5





- Related Data


                                    Allergies











Allergy/AdvReac Type Severity Reaction Status Date / Time


 


chocolate flavor Allergy  Hives Verified 21 10:12


 


diazepam [From Valium] Allergy  Confusion Verified 21 10:12


 


diphenhydramine Allergy  Confusion Verified 21 10:12





[From Benadryl]     


 


kiwi Allergy  Blisters Verified 21 10:12


 


lisinopril [From Prinivil] Allergy  Swollen Verified 21 10:12





   Tongue  


 


metformin [From Glucophage] Allergy  Cough Verified 21 10:12


 


pantoprazole Allergy  Other Verified 21 10:12


 


pineapple Allergy  Blisters Verified 21 10:12


 


BEE VENOM Allergy  Swelling Uncoded 21 01:29











Home Meds: 


                                    Home Meds





Acetaminophen [Tylenol Extra Strength] 1,000 mg PO BID PRN 16 [History]


Aspirin [Halfprin] 81 mg PO DAILY 16 [History]


Calcium Citrate/Vitamin D3 [Calcium Citrate + D] 1 tab PO DAILY 16 

[History]


Insulin Aspart [Novolog Flexpen] 10 - 20 units SQ TIDMEALS 16 [History]


Insulin Detemir [Levemir Flextouch] 46 unit SQ BEDTIME 16 [History]


Multivitamin [Multivitamins] 1 cap PO DAILY 16 [History]


atorvaSTATin [Lipitor] 40 mg PO BEDTIME 16 [History]


Metoprolol Tartrate 25 mg PO BID 19 [History]


Omeprazole 20 mg PO DAILY 19 [History]


Alogliptin Benzoate [Alogliptin] 12.5 mg PO DAILY 20 [History]


Betamethasone Dipropionate [Betamethasone Diprop Augmented] 1 applic TOP BID PRN

20 [History]


Cholecalciferol (Vitamin D3) [Vitamin D3] 25 mcg PO DAILY 20 [History]


Fluticasone Propionate [Flonase] 1 spray NASBOTH BEDTIME 20 [History]


Losartan [Cozaar] 50 mg PO DAILY 20 [History]


Montelukast [Singulair] 10 mg PO BEDTIME 20 [History]


Albuterol [Proair HFA] 1 puff INH Q4H PRN 20 [History]


Cetirizine HCl 10 mg PO BEDTIME 20 [History]


Magnesium Oxide [Magnesium] 400 mg PO DAILY 21 [History]











Past Medical History


HEENT History: Reports: Cataract, Hard of Hearing, Impaired Vision


Other HEENT History: wears glasses


Cardiovascular History: Reports: CAD, High Cholesterol, Hypertension, MI


Respiratory History: Reports: Asthma


Gastrointestinal History: Reports: GERD, Hiatal Hernia, Other (See Below)


Other Gastrointestinal History: Schatzki's ring


Genitourinary History: Reports: Chronic Renal Insuffiency, Other (See Below)


Other Genitourinary History: Renal stent.  Renal artery stenosis


OB/GYN History: Reports: Pregnancy


Musculoskeletal History: Reports: Osteoarthritis


Other Musculoskeletal History: Fractured left ankle x3


Neurological History: Reports: None


Psychiatric History: Reports: None


Endocrine/Metabolic History: Reports: Diabetes, Type II


Hematologic History: Reports: None


Immunologic History: Reports: None


Oncologic (Cancer) History: Reports: None


Dermatologic History: Reports: None





- Infectious Disease History


Infectious Disease History: Reports: Chicken Pox, Measles, Mumps





- Past Surgical History


Head Surgeries/Procedures: Reports: None


HEENT Surgical History: Reports: Cataract Surgery


Cardiovascular Surgical History: Reports: Coronary Artery Bypass, Coronary 

Artery Stent


Other Cardiovascular Surgeries/Procedures: Electrocardiogram


Respiratory Surgical History: Reports: None


GI Surgical History: Reports: EGD, Esophageal Dilatation


Female  Surgical History: Reports: None


Endocrine Surgical History: Reports: None


Neurological Surgical History: Reports: None


Musculoskeletal Surgical History: Reports: Other (See Below)


Other Musculoskeletal Surgeries/Procedures:: Left knee surgery


Oncologic Surgical History: Reports: None


Dermatological Surgical History: Reports: None





Social & Family History





- Family History


Family Medical History: No Pertinent Family History


Cardiac: Reports: High Cholesterol, Hypertension, MI


: Reports: Dialysis


Musculoskeletal: Reports: Arthritis


Endocrine/Metabolic: Reports: Diabetes, type II


Oncologic: Reports: Lung





- Tobacco Use


Tobacco Use Status *Q: Never Tobacco User





- Caffeine Use


Caffeine Use: Reports: Coffee





- Recreational Drug Use


Recreational Drug Use: No





- Living Situation & Occupation


Living situation: Reports: with Family


Occupation: Retired





ED ROS GENERAL





- Review of Systems


Review Of Systems: Comprehensive ROS is negative, except as noted in HPI.





ED EXAM, GENERAL





- Physical Exam


Exam: See Below


Exam Limited By: No Limitations


General Appearance: Alert, No Apparent Distress, Thin


Eye Exam: Bilateral Eye: EOMI, Normal Inspection, PERRL (3mm)


Ears: Normal External Exam, Normal Canal, Hearing Grossly Normal, Normal TMs


Nose: Normal Inspection, Normal Mucosa


Throat/Mouth: Normal Inspection, Normal Oropharynx, Normal Voice, No Airway 

Compromise


Head: Atraumatic, Other


Neck: Normal Inspection, Supple, Non-Tender, Full Range of Motion.  No: 

Lymphadenopathy (L), Lymphadenopathy (R)


Respiratory/Chest: No Respiratory Distress, Lungs Clear, Normal Breath Sounds, 

No Accessory Muscle Use, Chest Non-Tender.  No: Crackles, Rales, Rhonchi, 

Wheezing, Stridor


Cardiovascular: Normal Peripheral Pulses, Regular Rate, Rhythm, No Edema, No 

Gallop, No JVD, No Murmur, No Rub


Peripheral Pulses: 2+: Radial (L), Radial (R)


GI/Abdominal: Soft, No Distention, No Abnormal Bruit, No Mass, Pelvis Stable, 

Tender (With cough to RLQ), Abnormal Bowel Sounds (Hyperactive)


 (Female) Exam: Deferred


Rectal (Female) Exam: Deferred


Back Exam: Normal Inspection, Full Range of Motion.  No: CVA Tenderness (L), CVA

 Tenderness (R)


Extremities: Normal Inspection, Normal Range of Motion, Non-Tender, No Pedal Prince

ma, Normal Capillary Refill


Neurological: Alert, Oriented, CN II-XII Intact, Normal Cognition, Normal Gait, 

No Motor/Sensory Deficits


Psychiatric: Normal Affect, Normal Mood


Skin Exam: Warm, Dry, Intact, Normal Color, No Rash.  No: Cyanosis, Jaundice, 

Mottled, Pallor


  ** #1 Interpretation


Comparison: Change From Previous EKG





  ** #2 Interpretation


IN/PQ Interval: 0.171





Course





- Vital Signs


Last Recorded V/S: 


                                Last Vital Signs











Temp  97.2 F   21 10:03


 


Pulse  81   21 10:03


 


Resp  16   21 10:03


 


BP  118/70   21 10:03


 


Pulse Ox  98   21 10:03














- Orders/Labs/Meds


Orders: 


                               Active Orders 24 hr











 Category Date Time Status


 


 CULTURE URINE [RM] Stat Lab  21 12:34 Results











Labs: 


                                Laboratory Tests











  21 Range/Units





  10:05 10:05 10:05 


 


WBC  8.7    (5.0-10.0)  10^3/uL


 


RBC  4.31    (4.2-5.4)  10^6/uL


 


Hgb  12.5    (12.0-16.0)  g/dL


 


Hct  35.7 L    (37.0-47.0)  %


 


MCV  82.8    ()  fL


 


MCH  29.0    (27.0-34.0)  pg


 


MCHC  35.0    (33.0-35.0)  g/dL


 


Plt Count  295    (150-450)  10^3/uL


 


Neut % (Auto)  71.8    (42.2-75.2)  %


 


Lymph % (Auto)  12.1 L    (20.5-50.1)  %


 


Mono % (Auto)  15.7 H    (2-8)  %


 


Eos % (Auto)  0.3 L    (1.0-3.0)  %


 


Baso % (Auto)  0.1    (0.0-1.0)  %


 


Add Manual Diff  Yes    


 


Neutrophils % (Manual)  68    (42-75)  %


 


Band Neutrophils %  7    %


 


Lymphocytes % (Manual)  17 L    (20-50)  %


 


Monocytes % (Manual)  8    (2-8)  %


 


Polychromasia      


 


Sodium   123 L   (136-145)  mmol/L


 


Potassium   3.8   (3.5-5.1)  mmol/L


 


Chloride   87 L   ()  mmol/L


 


Carbon Dioxide   28   (21-32)  mmol/L


 


Anion Gap   11.8   (7-13)  mEq/L


 


BUN   18   (7-18)  mg/dL


 


Creatinine   1.37 H   (0.55-1.02)  mg/dL


 


Est Cr Clr Drug Dosing   28.83   mL/min


 


Estimated GFR (MDRD)   38   


 


BUN/Creatinine Ratio   13.1   (No establ ref range)  


 


Glucose   183 H   (70-99)  mg/dL


 


Lactic Acid    1.4  (0.4-2.0)  mmol/L


 


Calcium   9.2   (8.5-10.1)  mg/dL


 


Total Bilirubin   0.7   (0.2-1.0)  mg/dL


 


AST   17   (15-37)  U/L


 


ALT   23   (14-59)  U/L


 


Alkaline Phosphatase   86   ()  U/L


 


Troponin I High Sens   11   (<=51)  pg/mL


 


C-Reactive Protein   13.5 H   (0.0-0.9)  mg/dL


 


B-Natriuretic Peptide   123 H   (0-100)  pg/ml


 


Total Protein   7.7   (6.4-8.2)  g/dL


 


Albumin   2.8 L   (3.4-5.0)  g/dL


 


Globulin   4.9   


 


Albumin/Globulin Ratio   0.57   


 


Urine Color     (YELLOW)  


 


Urine Appearance     (CLEAR)  


 


Urine pH     (5.0-9.0)  


 


Ur Specific Gravity     (1.005-1.030)  


 


Urine Protein     (NEGATIVE)  


 


Urine Glucose (UA)     (NEGATIVE)  


 


Urine Ketones     (NEGATIVE)  


 


Urine Occult Blood     (NEGATIVE)  


 


Urine Nitrite     (NEGATIVE)  


 


Urine Bilirubin     (NEGATIVE)  


 


Urine Urobilinogen     (0.2-1.0)  mg/dL


 


Ur Leukocyte Esterase     (NEGATIVE)  


 


Urine RBC     (0-5)  /HPF


 


Urine WBC     (0-5/HPF)  /HPF


 


Ur Epithelial Cells     (NOT SEEN)  /HPF


 


Urine Bacteria     (0-FEW/HPF)  /HPF


 


SARS-CoV-2 RNA (LEAH)     (NEGATIVE)  














  21 Range/Units





  10:20 12:34 


 


WBC    (5.0-10.0)  10^3/uL


 


RBC    (4.2-5.4)  10^6/uL


 


Hgb    (12.0-16.0)  g/dL


 


Hct    (37.0-47.0)  %


 


MCV    ()  fL


 


MCH    (27.0-34.0)  pg


 


MCHC    (33.0-35.0)  g/dL


 


Plt Count    (150-450)  10^3/uL


 


Neut % (Auto)    (42.2-75.2)  %


 


Lymph % (Auto)    (20.5-50.1)  %


 


Mono % (Auto)    (2-8)  %


 


Eos % (Auto)    (1.0-3.0)  %


 


Baso % (Auto)    (0.0-1.0)  %


 


Add Manual Diff    


 


Neutrophils % (Manual)    (42-75)  %


 


Band Neutrophils %    %


 


Lymphocytes % (Manual)    (20-50)  %


 


Monocytes % (Manual)    (2-8)  %


 


Polychromasia    


 


Sodium    (136-145)  mmol/L


 


Potassium    (3.5-5.1)  mmol/L


 


Chloride    ()  mmol/L


 


Carbon Dioxide    (21-32)  mmol/L


 


Anion Gap    (7-13)  mEq/L


 


BUN    (7-18)  mg/dL


 


Creatinine    (0.55-1.02)  mg/dL


 


Est Cr Clr Drug Dosing    mL/min


 


Estimated GFR (MDRD)    


 


BUN/Creatinine Ratio    (No establ ref range)  


 


Glucose    (70-99)  mg/dL


 


Lactic Acid    (0.4-2.0)  mmol/L


 


Calcium    (8.5-10.1)  mg/dL


 


Total Bilirubin    (0.2-1.0)  mg/dL


 


AST    (15-37)  U/L


 


ALT    (14-59)  U/L


 


Alkaline Phosphatase    ()  U/L


 


Troponin I High Sens    (<=51)  pg/mL


 


C-Reactive Protein    (0.0-0.9)  mg/dL


 


B-Natriuretic Peptide    (0-100)  pg/ml


 


Total Protein    (6.4-8.2)  g/dL


 


Albumin    (3.4-5.0)  g/dL


 


Globulin    


 


Albumin/Globulin Ratio    


 


Urine Color   Yellow  (YELLOW)  


 


Urine Appearance   Cloudy  (CLEAR)  


 


Urine pH   6.0  (5.0-9.0)  


 


Ur Specific Gravity   1.010  (1.005-1.030)  


 


Urine Protein   100 H  (NEGATIVE)  


 


Urine Glucose (UA)   100 H  (NEGATIVE)  


 


Urine Ketones   15 H  (NEGATIVE)  


 


Urine Occult Blood   Small H  (NEGATIVE)  


 


Urine Nitrite   Negative  (NEGATIVE)  


 


Urine Bilirubin   Negative  (NEGATIVE)  


 


Urine Urobilinogen   1.0  (0.2-1.0)  mg/dL


 


Ur Leukocyte Esterase   Large H  (NEGATIVE)  


 


Urine RBC   5-10 H  (0-5)  /HPF


 


Urine WBC   >100 H  (0-5/HPF)  /HPF


 


Ur Epithelial Cells   Few  (NOT SEEN)  /HPF


 


Urine Bacteria   Moderate H  (0-FEW/HPF)  /HPF


 


SARS-CoV-2 RNA (LEAH)  Negative   (NEGATIVE)  











Meds: 


Medications














Discontinued Medications














Generic Name Dose Route Start Last Admin





  Trade Name Freq  PRN Reason Stop Dose Admin


 


Benzocaine/Menthol  1 lozenge  21 11:09  21 11:37





  Benzocaine/Cetylpyridinium/Menthol Lozenge  MUCMEM  21 11:10  1 lozenge





  ONETIME ONE   Administration


 


Benzonatate  100 mg  21 11:08  21 11:27





  Benzonatate 100 Mg Cap  PO  21 11:09  100 mg





  ONETIME ONE   Administration


 


Sodium Chloride  1,000 mls @ 999 mls/hr  21 11:08  21 11:27





  Normal Saline  IV  21 12:08  999 mls/hr





  .BOLUS ONE   Administration














- Radiology Interpretation


Free Text/Narrative:: 


[facility]


Final Radiology Report  Call: [phone]


assistance Online chat: [email or web address]


Name: KORTNEY VINSON Age: 71Years F Date: 2021


MRN: [id number] SSN: -- : 1950


Study: CR CHEST 1V FRONTAL Requesting Physician: Chastity Madera


Accession: [id number] Images: 1


Addl Studies:


Provided Clinical History: Shortness of breath; Wheezing


Contrast: Contrast Medium:


Contrast Amount: Contrast Method:


CONFIDENTIALITY STATEMENT


This report is intended only for use by the referring physician, and only in 

accordance with law. If you received this in error, call [phone].


Page 1 of 1


PROCEDURE INFORMATION:


Exam: XR Chest


Exam date and time: 2021 10:28 AM


Age: 71 years old


Clinical indication: Shortness of breath and wheezing; Additional info: 

Shortness of breath;


Wheezing


TECHNIQUE:


Imaging protocol: XR of the chest.


Views: 1 view.


COMPARISON:


CR Chest 1V Frontal 2021 1:54 AM


FINDINGS:


Lungs: Unremarkable. No consolidation.


Pleural spaces: Unremarkable. No pleural effusion. No pneumothorax.


Heart/Mediastinum: Unremarkable. No cardiomegaly.


Bones/joints: Median sternotomy wires


IMPRESSION:


No acute cardiopulmonary findings.


Thank you for allowing us to participate in the care of your patient.


Dictated and Authenticated by: Dylan Saleem MD


2021 10:47 AM Central Time ([place] & [place])








- Re-Assessments/Exams


Free Text/Narrative Re-Assessment/Exam: 





21


Findings of examination, lab work, and imaging reviewed with patient.  Will 

treat viral URI with Tessalon Perles and Cepacol Lozenges.  Will treat UTI with 

Macrobid. Discussed supportive cares for viral URI and UTI.  Patient instructed 

to follow up with PCP regarding today's visit. Red flag signs and symptoms which

 would warrant reevaluation reviewed.  Patient verbalized understanding and 

agreement with the plan of care.











Departure





- Departure


Time of Disposition: 13:34


Disposition: Home, Self-Care 01


Condition: Fair


Clinical Impression: 


 Hyponatremia





Upper respiratory infection


Qualifiers:


 URI type: unspecified viral URI Qualified Code(s): J06.9 - Acute upper 

respiratory infection, unspecified





Urinary tract infection


Qualifiers:


 Urinary tract infection type: acute cystitis Hematuria presence: without 

hematuria Qualified Code(s): N30.00 - Acute cystitis without hematuria








- Discharge Information


*PRESCRIPTION DRUG MONITORING PROGRAM REVIEWED*: Not Applicable


*COPY OF PRESCRIPTION DRUG MONITORING REPORT IN PATIENT SHAKIR: Not Applicable


Instructions:  Upper Respiratory Infection, Adult, Hyponatremia, Urinary Tract 

Infection, Adult


Referrals: 


PCP,None [Primary Care Provider] - 


Forms:  ED Department Discharge


Additional Instructions: 


Rx: Macrobid


Rx: Tessalon Perles


Rx: Cepacol





1.) Take all of your antibiotic until gone, even as symptoms improve. 


2.) You may take acetaminophen (Tylenol) 650mg-1000mg every six hours as pain 

persists. 


3.) Eat small, snack like meals (like chips, popcorn, crackers) to prevent 

nausea and increase sodium. 


4.) Follow up with your primary care provider in 2-3 days, or return to the 

emergency department with any persistent or worsening symptoms despite 

medications. 





- My Orders


Last 24 Hours: 


My Active Orders





21 12:34


CULTURE URINE [RM] Stat 














- Assessment/Plan


Last 24 Hours: 


My Active Orders





21 12:34


CULTURE URINE [RM] Stat

## 2021-08-23 NOTE — CR
PROCEDURE INFORMATION: 

Exam: XR Chest 

Exam date and time: 8/23/2021 10:28 AM 

Age: 71 years old 

Clinical indication: Shortness of breath and wheezing; Additional info: 

Shortness of breath; Wheezing 



TECHNIQUE: 

Imaging protocol: XR of the chest. 

Views: 1 view. 



COMPARISON: 

CR Chest 1V Frontal 4/7/2021 1:54 AM 



FINDINGS: 

Lungs: Unremarkable. No consolidation. 

Pleural spaces: Unremarkable. No pleural effusion. No pneumothorax. 

Heart/Mediastinum: Unremarkable. No cardiomegaly. 

Bones/joints: Median sternotomy wires 



IMPRESSION: 

No acute cardiopulmonary findings.

## 2022-08-22 ENCOUNTER — HOSPITAL ENCOUNTER (EMERGENCY)
Dept: HOSPITAL 43 - DL.ED | Age: 72
Discharge: HOME | End: 2022-08-22
Payer: COMMERCIAL

## 2022-08-22 VITALS — HEART RATE: 86 BPM | SYSTOLIC BLOOD PRESSURE: 181 MMHG | DIASTOLIC BLOOD PRESSURE: 88 MMHG

## 2022-08-22 DIAGNOSIS — Z20.822: ICD-10-CM

## 2022-08-22 DIAGNOSIS — J40: Primary | ICD-10-CM

## 2022-08-22 DIAGNOSIS — Z91.030: ICD-10-CM

## 2022-08-22 DIAGNOSIS — Z79.899: ICD-10-CM

## 2022-08-22 DIAGNOSIS — I25.2: ICD-10-CM

## 2022-08-22 DIAGNOSIS — E87.1: ICD-10-CM

## 2022-08-22 DIAGNOSIS — I10: ICD-10-CM

## 2022-08-22 DIAGNOSIS — Z91.018: ICD-10-CM

## 2022-08-22 DIAGNOSIS — Z79.82: ICD-10-CM

## 2022-08-22 DIAGNOSIS — N30.00: ICD-10-CM

## 2022-08-22 DIAGNOSIS — I25.10: ICD-10-CM

## 2022-08-22 DIAGNOSIS — Z88.8: ICD-10-CM

## 2022-08-22 DIAGNOSIS — E11.65: ICD-10-CM

## 2022-08-22 DIAGNOSIS — E83.42: ICD-10-CM

## 2022-08-22 DIAGNOSIS — Z79.4: ICD-10-CM

## 2022-08-22 LAB
ANION GAP SERPL CALC-SCNC: 11.5 MEQ/L (ref 7–13)
CHLORIDE SERPL-SCNC: 89 MMOL/L (ref 98–107)
EGFRCR SERPLBLD CKD-EPI 2021: 43 ML/MIN (ref 60–?)
SARS-COV-2 RNA RESP QL NAA+PROBE: NEGATIVE
SODIUM SERPL-SCNC: 124 MMOL/L (ref 136–145)

## 2022-08-22 PROCEDURE — 84443 ASSAY THYROID STIM HORMONE: CPT

## 2022-08-22 PROCEDURE — 99284 EMERGENCY DEPT VISIT MOD MDM: CPT

## 2022-08-22 PROCEDURE — 82947 ASSAY GLUCOSE BLOOD QUANT: CPT

## 2022-08-22 PROCEDURE — 85379 FIBRIN DEGRADATION QUANT: CPT

## 2022-08-22 PROCEDURE — 83605 ASSAY OF LACTIC ACID: CPT

## 2022-08-22 PROCEDURE — 87186 SC STD MICRODIL/AGAR DIL: CPT

## 2022-08-22 PROCEDURE — 84484 ASSAY OF TROPONIN QUANT: CPT

## 2022-08-22 PROCEDURE — 83735 ASSAY OF MAGNESIUM: CPT

## 2022-08-22 PROCEDURE — 81001 URINALYSIS AUTO W/SCOPE: CPT

## 2022-08-22 PROCEDURE — 71045 X-RAY EXAM CHEST 1 VIEW: CPT

## 2022-08-22 PROCEDURE — 0240U: CPT

## 2022-08-22 PROCEDURE — 83690 ASSAY OF LIPASE: CPT

## 2022-08-22 PROCEDURE — 80053 COMPREHEN METABOLIC PANEL: CPT

## 2022-08-22 PROCEDURE — 93005 ELECTROCARDIOGRAM TRACING: CPT

## 2022-08-22 PROCEDURE — 87088 URINE BACTERIA CULTURE: CPT

## 2022-08-22 PROCEDURE — 83880 ASSAY OF NATRIURETIC PEPTIDE: CPT

## 2022-08-22 PROCEDURE — 36415 COLL VENOUS BLD VENIPUNCTURE: CPT

## 2022-08-22 PROCEDURE — 96366 THER/PROPH/DIAG IV INF ADDON: CPT

## 2022-08-22 PROCEDURE — 96365 THER/PROPH/DIAG IV INF INIT: CPT

## 2022-08-22 PROCEDURE — 93010 ELECTROCARDIOGRAM REPORT: CPT

## 2022-08-22 PROCEDURE — 96375 TX/PRO/DX INJ NEW DRUG ADDON: CPT

## 2022-08-22 PROCEDURE — 87040 BLOOD CULTURE FOR BACTERIA: CPT

## 2022-08-22 PROCEDURE — 87086 URINE CULTURE/COLONY COUNT: CPT

## 2022-08-22 PROCEDURE — 85025 COMPLETE CBC W/AUTO DIFF WBC: CPT

## 2023-01-15 ENCOUNTER — HOSPITAL ENCOUNTER (EMERGENCY)
Dept: HOSPITAL 43 - DL.ED | Age: 73
Discharge: HOME | End: 2023-01-15
Payer: MEDICARE

## 2023-01-15 VITALS — SYSTOLIC BLOOD PRESSURE: 179 MMHG | HEART RATE: 68 BPM | DIASTOLIC BLOOD PRESSURE: 77 MMHG

## 2023-01-15 DIAGNOSIS — Z88.8: ICD-10-CM

## 2023-01-15 DIAGNOSIS — I25.10: ICD-10-CM

## 2023-01-15 DIAGNOSIS — Z86.16: ICD-10-CM

## 2023-01-15 DIAGNOSIS — R11.2: ICD-10-CM

## 2023-01-15 DIAGNOSIS — Z79.4: ICD-10-CM

## 2023-01-15 DIAGNOSIS — U07.1: Primary | ICD-10-CM

## 2023-01-15 DIAGNOSIS — K21.9: ICD-10-CM

## 2023-01-15 DIAGNOSIS — Z79.899: ICD-10-CM

## 2023-01-15 DIAGNOSIS — N18.9: ICD-10-CM

## 2023-01-15 DIAGNOSIS — E78.00: ICD-10-CM

## 2023-01-15 DIAGNOSIS — Z79.82: ICD-10-CM

## 2023-01-15 DIAGNOSIS — E11.22: ICD-10-CM

## 2023-01-15 DIAGNOSIS — I25.2: ICD-10-CM

## 2023-01-15 DIAGNOSIS — Z91.018: ICD-10-CM

## 2023-01-15 DIAGNOSIS — I12.9: ICD-10-CM

## 2023-01-15 DIAGNOSIS — Z91.030: ICD-10-CM

## 2023-01-15 LAB
ANION GAP SERPL CALC-SCNC: 13.5 MEQ/L (ref 7–13)
CHLORIDE SERPL-SCNC: 98 MMOL/L (ref 98–107)
EGFRCR SERPLBLD CKD-EPI 2021: 48 ML/MIN (ref 60–?)
RSV RNA UPPER RESP QL NAA+PROBE: NEGATIVE
SARS-COV-2 RNA RESP QL NAA+PROBE: POSITIVE
SODIUM SERPL-SCNC: 131 MMOL/L (ref 136–145)

## 2023-01-15 PROCEDURE — 93005 ELECTROCARDIOGRAM TRACING: CPT

## 2023-01-15 PROCEDURE — 84484 ASSAY OF TROPONIN QUANT: CPT

## 2023-01-15 PROCEDURE — 85025 COMPLETE CBC W/AUTO DIFF WBC: CPT

## 2023-01-15 PROCEDURE — 0241U: CPT

## 2023-01-15 PROCEDURE — 86140 C-REACTIVE PROTEIN: CPT

## 2023-01-15 PROCEDURE — 36415 COLL VENOUS BLD VENIPUNCTURE: CPT

## 2023-01-15 PROCEDURE — 80053 COMPREHEN METABOLIC PANEL: CPT

## 2023-01-15 PROCEDURE — 83605 ASSAY OF LACTIC ACID: CPT

## 2023-01-15 PROCEDURE — 99285 EMERGENCY DEPT VISIT HI MDM: CPT

## 2023-01-15 PROCEDURE — 71045 X-RAY EXAM CHEST 1 VIEW: CPT
